# Patient Record
Sex: MALE | Race: WHITE
[De-identification: names, ages, dates, MRNs, and addresses within clinical notes are randomized per-mention and may not be internally consistent; named-entity substitution may affect disease eponyms.]

---

## 2022-01-21 ENCOUNTER — HOSPITAL ENCOUNTER (INPATIENT)
Dept: HOSPITAL 75 - ICU | Age: 62
LOS: 4 days | Discharge: HOME | DRG: 177 | End: 2022-01-25
Attending: INTERNAL MEDICINE | Admitting: INTERNAL MEDICINE
Payer: OTHER GOVERNMENT

## 2022-01-21 VITALS — DIASTOLIC BLOOD PRESSURE: 69 MMHG | SYSTOLIC BLOOD PRESSURE: 125 MMHG

## 2022-01-21 VITALS — WEIGHT: 246.7 LBS | HEIGHT: 69.02 IN | BODY MASS INDEX: 36.54 KG/M2

## 2022-01-21 DIAGNOSIS — G47.33: ICD-10-CM

## 2022-01-21 DIAGNOSIS — I48.0: ICD-10-CM

## 2022-01-21 DIAGNOSIS — J44.9: ICD-10-CM

## 2022-01-21 DIAGNOSIS — I42.2: ICD-10-CM

## 2022-01-21 DIAGNOSIS — E11.9: ICD-10-CM

## 2022-01-21 DIAGNOSIS — T38.0X5A: ICD-10-CM

## 2022-01-21 DIAGNOSIS — J12.82: ICD-10-CM

## 2022-01-21 DIAGNOSIS — E78.2: ICD-10-CM

## 2022-01-21 DIAGNOSIS — Z73.0: ICD-10-CM

## 2022-01-21 DIAGNOSIS — E66.9: ICD-10-CM

## 2022-01-21 DIAGNOSIS — I10: ICD-10-CM

## 2022-01-21 DIAGNOSIS — J96.01: ICD-10-CM

## 2022-01-21 DIAGNOSIS — U07.1: Primary | ICD-10-CM

## 2022-01-21 LAB
ARTERIAL PATENCY WRIST A: (no result)
BASE EXCESS STD BLDA CALC-SCNC: -0.2 MMOL/L (ref -2.5–2.5)
BDY SITE: (no result)
BODY TEMPERATURE: 37
BUN/CREAT SERPL: 16
CALCIUM SERPL-MCNC: 8.2 MG/DL (ref 8.5–10.1)
CHLORIDE SERPL-SCNC: 101 MMOL/L (ref 98–107)
CO2 BLDA CALC-SCNC: 25.9 MMOL/L (ref 21–31)
CO2 SERPL-SCNC: 21 MMOL/L (ref 21–32)
CREAT SERPL-MCNC: 0.64 MG/DL (ref 0.6–1.3)
GFR SERPLBLD BASED ON 1.73 SQ M-ARVRAT: 108 ML/MIN
GLUCOSE SERPL-MCNC: 130 MG/DL (ref 70–105)
INHALED O2 FLOW RATE: 2 L/MIN
PCO2 BLDA: 45 MMHG (ref 35–45)
PH BLDA: 7.36 [PH] (ref 7.37–7.43)
PO2 BLDA: 33 MMHG (ref 79–93)
POTASSIUM SERPL-SCNC: 3.9 MMOL/L (ref 3.6–5)
SAO2 % BLDA FROM PO2: 50 % (ref 94–100)
SODIUM SERPL-SCNC: 133 MMOL/L (ref 135–145)
VENTILATION MODE VENT: NO

## 2022-01-21 PROCEDURE — 83036 HEMOGLOBIN GLYCOSYLATED A1C: CPT

## 2022-01-21 PROCEDURE — 71275 CT ANGIOGRAPHY CHEST: CPT

## 2022-01-21 PROCEDURE — 83735 ASSAY OF MAGNESIUM: CPT

## 2022-01-21 PROCEDURE — 84100 ASSAY OF PHOSPHORUS: CPT

## 2022-01-21 PROCEDURE — 36415 COLL VENOUS BLD VENIPUNCTURE: CPT

## 2022-01-21 PROCEDURE — 80048 BASIC METABOLIC PNL TOTAL CA: CPT

## 2022-01-21 PROCEDURE — 80061 LIPID PANEL: CPT

## 2022-01-21 PROCEDURE — 85025 COMPLETE CBC W/AUTO DIFF WBC: CPT

## 2022-01-21 PROCEDURE — 71045 X-RAY EXAM CHEST 1 VIEW: CPT

## 2022-01-21 PROCEDURE — 80053 COMPREHEN METABOLIC PANEL: CPT

## 2022-01-21 PROCEDURE — 82947 ASSAY GLUCOSE BLOOD QUANT: CPT

## 2022-01-21 PROCEDURE — 94640 AIRWAY INHALATION TREATMENT: CPT

## 2022-01-21 PROCEDURE — 82805 BLOOD GASES W/O2 SATURATION: CPT

## 2022-01-21 PROCEDURE — 94760 N-INVAS EAR/PLS OXIMETRY 1: CPT

## 2022-01-21 PROCEDURE — 94761 N-INVAS EAR/PLS OXIMETRY MLT: CPT

## 2022-01-21 PROCEDURE — 93005 ELECTROCARDIOGRAM TRACING: CPT

## 2022-01-21 PROCEDURE — 93306 TTE W/DOPPLER COMPLETE: CPT

## 2022-01-21 RX ADMIN — SODIUM CHLORIDE SCH MLS/HR: 900 INJECTION, SOLUTION INTRAVENOUS at 17:49

## 2022-01-21 RX ADMIN — INSULIN ASPART SCH UNIT: 100 INJECTION, SOLUTION INTRAVENOUS; SUBCUTANEOUS at 21:18

## 2022-01-21 RX ADMIN — DOCUSATE SODIUM SCH MG: 100 CAPSULE ORAL at 21:18

## 2022-01-21 RX ADMIN — GUAIFENESIN SCH MG: 600 TABLET, EXTENDED RELEASE ORAL at 21:06

## 2022-01-21 RX ADMIN — ENOXAPARIN SODIUM SCH MG: 100 INJECTION SUBCUTANEOUS at 17:49

## 2022-01-21 RX ADMIN — SENNOSIDES SCH MG: 8.6 TABLET, FILM COATED ORAL at 21:18

## 2022-01-21 RX ADMIN — SODIUM CHLORIDE SCH MLS/HR: 900 INJECTION INTRAVENOUS at 21:07

## 2022-01-21 RX ADMIN — INSULIN ASPART SCH UNIT: 100 INJECTION, SOLUTION INTRAVENOUS; SUBCUTANEOUS at 16:00

## 2022-01-21 NOTE — DIAGNOSTIC IMAGING REPORT
PROCEDURE: CT angiography of the chest with contrast.



TECHNIQUE: Multiple contiguous axial images were obtained through

the chest after uneventful bolus administration of intravenous

contrast. 3D reconstructed CTA MIP acquisitions were also

performed. Auto Exposure Controls were utilized during the CT

exam to meet ALARA standards for radiation dose reduction. 

 

INDICATION: Covid patient. Respiratory distress.



FINDINGS: There are no intraluminal pulmonary arterial filling

defects. There are no findings of pulmonary arterial embolus. The

heart is mildly enlarged. There are five lobe patchy groundglass

infiltrates in keeping with the history of Covid pneumonia. No

effusion or pneumothorax. No mediastinal gas. The visualized

upper abdomen nonacute. The aorta is patent and nonaneurysmal.

There is a tiny hiatal hernia.



IMPRESSION: No PE or acute aortic disease identified. Five lobe

groundglass infiltrates consistent with Covid pneumonia. No

effusion or pneumothorax. 



Dictated by: 



  Dictated on workstation # [id number]

## 2022-01-21 NOTE — XMS REPORT
Clinical Summary

                             Created on: 2022



Joelle Azul

External Reference #: [id number]

: 1960

Sex: Male



Demographics





                          Address                    [address] [place] [address]  [address]

 

                          Home Phone                [phone]

 

                          Preferred Language        English

 

                          Marital Status            

 

                          Yazidi Affiliation     [id number]

 

                          Race                      White

 

                          Ethnic Group              Not  or 





Author





                          Author                    [facility]

 

                          Organization              [facility]

 

                          Address                   Unknown

 

                          Phone                     Unavailable







Support





                Name            Relationship    Address         Phone

 

                    Jordana Azul       [employer]                 [address] [place] [address]  [address]                      [phone]







Care Team Providers





                    Care Team Member Name Role                Phone

 

                    AndersonMary Ellen AMAYA PCP                 [phone]







Allergies





                                        Comments



                 Active Allergy  Reactions       Severity        Noted Date 

 

                                        



Tightness in throat and fever



                 Influenza Virus Vaccines  Shortness Of    High             



                                         Breath,   



                                         Nausea And   



                                         Vomiting   







Medications





                          End Date                  Status



              Medication   Sig          Dispensed    Refills      Start  



                                         Date  

 

                                                    Active



              multivitamin (THERAGRAN)  take 1 tablet  1            0           

   



                     per tablet          by oral route       4  



                                         every day     



                                         with food     

 

                                                    Active



                     ipratropium-albuterol  Inhale 3 mL         0   



                           (DUO-NEB) 0.5-3 mg/3 mL   via nebulizer     



                           nebulizer                 4 (four)     



                                         times a day     



                                         as needed.     

 

                                                    Active



                     acetaminophen 325 mg cap  Take by mouth       0   



                                         as needed.     

 

                                                    Active



                     ipratropium (ATROVENT)  Use 2 sprays        0   



                           0.06 % nasal spray        in each     



                                         nostril 4     



                                         (four) times     



                                         a day as     



                                         needed.     

 

                                                    Active



                     omega 3 fish oil (SEA  Take 3              0   



                           OMEGA)  mg-  capsules by     



                           mg (1,000 mg) capsule     mouth daily.     

 

                                                    Active



                     docusate sodium (COLACE)  Take 100 mg         0   



                           100 MG capsule            by mouth as     



                                         needed for     



                                         constipation.     

 

                                                    Active



                     LUTEIN ORAL         Take 1 tablet       0   



                                         by mouth     



                                         daily.     

 

                                                    Active



              atorvastatin (LIPITOR) 20  TAKE ONE (1)  90 tablet    3           

 02/10/202  



                     MG tablet           TABLET (20 MG       1  



                                         TOTAL) BY     



                                         MOUTH EVERY     



                                         OTHER DAY.     

 

                                                    Active



                     COQ10, UBIQUINOL, ORAL  Take 1 tablet       0   



                                         by mouth     



                                         daily.     

 

                                                    Active



                     ascorbic acid (VITAMIN C  Take 1 tablet       0   



                           ORAL)                     by mouth     



                                         daily.     

 

                                                    Active



                     ZINC ORAL           Take 1 tablet       0   



                                         by mouth     



                                         daily.     

 

                                                    Active



              metoprolol succinate  TAKE ONE (1)  90 tablet    3              



                     (TOPROL-XL) 100 MG 24 hr  TABLET BY           1  



                           tabletIndications: HOCM   MOUTH DAILY     



                                         (hypertrophic obstructive      



                                         cardiomyopathy) (HCC)      







Active Problems





 



                           Problem                   Noted Date

 

 



                           Hiatal hernia             12/10/2020

 

 



                           Atrial flutter            2018

 

 



                           S/P ablation of atrial fibrillation  2017

 

 



                           Agatston CAC score, <100  2017

 

 



                                         Overview:



                                         Formatting of this note might be differ

ent from the original.



                                         Agatston Score: 54.2

 

 



                           Reactive airway disease   2015

 

 



                           S/P patent foramen ovale closure  2005

 

 



                           H/O ventricular septal myectomy  2005

 

 



                           S/P mitral valve repair   2005

 

 



                                         Overview:



                                         Formatting of this note might be differ

ent from the original.



                                         #28 Gloria ring

 

 



                                         HOCM (hypertrophic obstructive cardiomy

opathy) 

 

 



                                         Mixed hyperlipidemia 

 

 



                                         Essential hypertension 

 

 



                                         Family history of premature CAD 

 

 



                                         Overview:



                                         Formatting of this note might be differ

ent from the original.



                                         sister at age 48

 

 



                                         LBBB (left bundle branch block) 

 

 



                                         Obesity (BMI 30-39.9) 

 

 



                                         Atrial dilatation, bilateral 

 

 



                                         Overview:



                                         Formatting of this note might be differ

ent from the original.



                                         Severe

 

 



                                         Persistent atrial fibrillation 

 

 



                                         Hypothyroidism 

 

 



                                         Coronary artery calcification of native

 artery 







Resolved Problems





  



                     Problem             Noted Date          Resolved Date

 

  



                           Coronary artery disease involving native coronary art

luisa of native heart  

2017                               12/10/2020

 

  



                           Atrial fibrillation       2017

 

  



                           Pre-diabetes              2017

 

  



                           Long term current use of anticoagulant   12/10/2020

 

  



                           Long term current use of antiarrhythmic drug   12/10/

2020







Encounters





                          Care Team                 Description



                     Date                Type                Specialty  

 

                                        



Yoan Chu MD                 Persistent atrial fibrillation (Primary 

Dx); 

Typical atrial flutter (HCC); 

HOCM (hypertrophic obstructive cardiomyopathy) (HCC); 

Dyslipidemia; 

LBBB (left bundle branch block); 

S/P ablation of atrial fibrillation; 

S/P ablation of atrial flutter; 

S/P ventricular septal myectomy; 

S/P mitral valve repair; 

S/P left atrial appendage ligation



                     2021          Office Visit        Cardiology  

 

                                        



Allison Kline RN                       



                     2021          Abstract            Cardiology  

 

                                        



Yoan Chu MD                  



                     2021          Documentation       Cardiology  



from Last 3 Months



Family History





   



                 Medical History  Relation        Name            Comments

 

   



                           Asthma                    Daughter  

 

   



                           Colon cancer              Father  

 

   



                           Hypertension              Maternal  



                                         Grandfather  

 

   



                           Stroke                    Maternal  



                                         Grandmother  

 

   



                           Coronary artery bypass    Sister  



                                         graft   

 

   



                           Coronary artery disease   Sister  

 

   



                           Diabetes                  Sister  







   



                 Relation        Name            Status          Comments

 

   



                                         Daughter   

 

   



                     Father                          Cause of death was 

Cancer, non-cardiac at age 48.



                                         (Age 48) 

 

   



                                         Maternal Grandfather   

 

   



                                         Maternal Grandmother   

 

   



                           Mother                     

 

   



                                         Sister   







Social History





                                        Date



                 Tobacco Use     Types           Packs/Day       Years Used 

 

                                         



                 Former Smoker   Cigars,         0.5             20 



                                         Cigarettes   

 

    



                     Smokeless Tobacco: Former  Chew                Quit: 



                                         User   







                                        Comments



                           Alcohol Use               Standard Drinks/Week 

 

                                         



                           Yes                       5 (1 standard drink = 0.6 o

z pure alcohol) 







 



                           Sex Assigned at Birth     Date Recorded

 

 



                                         Not on file 







Last Filed Vital Signs





                    Reading             Time Taken          Comments



                                         Vital Sign   

 

                    124/97              2021 10:38 AM CST  



                                         Blood Pressure   

 

                    65                  2021 10:38 AM CST  



                                         Pulse   

 

                    36.7 C (98.1 F) 2019  7:02 AM CDT  



                                         Temperature   

 

                    16                  2019  7:02 AM CDT  



                                         Respiratory Rate   

 

                    93%                 2019 10:22 AM CDT  



                                         Oxygen Saturation   

 

                    -                   -                    



                                         Inhaled Oxygen   



                                         Concentration   

 

                    112.5 kg (248 lb)   2021 10:38 AM CST  



                                         Weight   

 

                    170.2 cm (5' 7")    2021 10:38 AM CST  



                                         Height   

 

                    38.84               2021 10:38 AM CST  



                                         Body Mass Index   







Plan of Treatment





   



                 Health Maintenance  Due Date        Last Done       Comments

 

   



                           Hepatitis C Screen        1960  

 

   



                           Td/Tdap#                  1960  

 

   



                           COVID-19 Vaccine (1)      1965  

 

   



                           Colonoscopy               2005  

 

   



                           Zoster Vaccine# (1 of 2)  2010  

 

   



                     Pneumococcal Vaccine:  Aged Out            No longer eligib

le based on patient's age to



                           Pediatrics (0 to 5 Years)    complete this topic



                                         and At-Risk Patients (6   



                                         to 64 Years)   







Goals





     



            Goal       Patient    Associated  Recent Progress  Patient-Stat  Aut

hor



                     Goal Type           Problems            ed? 

 

     



              Blood Pressure < 140/90  Blood        124/97 (2021  No      

     Giocondo,



                     Pressure            10:38 AM CST)       Yoan BELLO MD







Procedures





                                        Comments



                 Procedure Name  Priority        Date/Time       Associated Diag

nosis 

 

                                        



Results for this procedure are in the results section.



                 ECG             Routine         2021      Typical atrial 

flutter 



                           10:31 AM CST              (HCC) 



                                         Persistent atrial 



                                         fibrillation 



                                         LBBB (left bundle branch 



                                         block) 



from Last 3 Months



Results

* Electrocardiogram (ECG) (2021 10:31 AM CST)



    



              Component    Value        Ref Range    Performed At  Pathologist



                                         Signature

 

    



                     QRSd                130                 [facility] 

 

    



                     QT                  428                 [facility] 

 

    



                     QTC                 445                 [facility] 

 

    



                     ECGHR               65                  [facility] 

 

    



                     ECGPR               188                 [facility] 











                                         Specimen

 









                                        Narrative

 

                                        



[facility] - 2021  8:53 AM CST



This result has an attachment that is not available.



               [facility]

                    

                    Test Date:  
2021

Pat Name:   JOELLE AZUL       Department:  [facility]

Patient ID:  [id number]         Room:     

Gender:    Male           Technician:  [id number]

:     1960        Requested By: YOAN CHU 

Order Number: [id number]        Reading MD:  Yoan Chu

                 Measurements

Intervals               Axis     

Rate:     65            P:      -36

NM:      188           QRS:     -14

QRSD:     130           T:      155

QT:      428                  

QTc:     445                  

              Interpretive Statements

SINUS RHYTHM

LEFT BUNDLE BRANCH BLOCK

Electronically Signed On 2021 8:53:00 CST by Yoan Chu







                                        Procedure Note

 

                                        



Yoan Chu MD - 2021



Formatting of this note might be different from the original.

                             [facility]

                                       

                                       Test Date:    2021

Pat Name:     JOELLE AZUL             Department:   [facility]

Patient ID:   [id number]                 Room:         

Gender:       Male                     Technician:   [id number]

:          1960               Requested By: YOAN CHU 

Order Number: [id number]                Reading MD:   Yoan Chu

                                 Measurements

Intervals                              Axis          

Rate:         65                       P:            -36

NM:           188                      QRS:          -14

QRSD:         130                      T:            155

QT:           428                                    

QTc:          445                                    

                           Interpretive Statements

SINUS RHYTHM

LEFT BUNDLE BRANCH BLOCK

Electronically Signed On 2021 8:53:00 CST by Yoan Chu







   



                 Performing Organization  Address         City/State/ZIP Code  P

jason Number

 

   



                                         [facility]   





from Last 3 Months



Insurance





                                        Type



            Payer      Benefit    Subscriber ID  Effective  Phone      Address 



                           Plan /                    Dates   



                                         Group     

 

                                         



            OTHER GOVERNMENT  [facility]    [id number]  3/25/2020-  [phone]  [facility]                Present             2021 



                           [place] 



                                         [address] 







     



            Guarantor Name  Account    Relation to  Date of    Phone      [place]

g Address



                     Type                Patient             Birth  

 

     



            Joelle Azul  Personal/F  Self       1960  [phone]  [address]



                     amily               (Home)              [place] [address]

 

     



            Joelle Azul  Personal/F  Self       1960  [phone]  [address]



                     amily               (Home)              [place] [address]

 

     



            Joelle Azul  Personal/F  Self       1960  [phone]  [address]



                     amily               (Home)              [place] [address]

 

     



            CristobalJoelle mckenzie ACE  Personal/F  Self       1960  [phone]  [address]



                     amily               (Home)              [place] [address]



                                         [phone] 



                                         (Work) 







Advance Directives





For more information, please contact: [phone]







                          Patient Representative    Explanation



                           Type                      Date Recorded  

 

                                                     



                                         Advance Directives   



                                         and Living Will   

 

                                                     



                                         Power of    

 

                                                     



                                         Health Care   



                                         Directive   











                          Date Inactivated          Comments



                           Code Status               Date Activated  

 

                          3/12/2019  1:35 PM         



                           Full Code                 3/11/2019 12:26 PM  







                                                     

 

                          2017  1:26 PM         



                           Full Code                 11/3/2017 11:24 AM  







Care Teams





                          Start Date                End Date



                     Team Member         Relationship        Specialty  

 

                          16                   



                     Mary Ellen Barron ARNP  PCP - General       Nurse  



                           [id number]           Practitioner  



                                         [place] [address]    



                                         [phone] (Work)    



                                         [phone] (Fax)

## 2022-01-21 NOTE — XMS REPORT
Encounter Summary

                             Created on: 2022



Carlos Azul

External Reference #: [id number]

: 1960

Sex: Male



Demographics





                          Address                    [address] [place] [address]  [address]

 

                          Home Phone                [phone]

 

                          Preferred Language        English

 

                          Marital Status            

 

                          Worship Affiliation     [id number]

 

                          Race                      White

 

                          Ethnic Group              Not  or 





Author





                          Author                    [facility]

 

                          Organization              [facility]

 

                          Address                   Unknown

 

                          Phone                     Unavailable







Support





                Name            Relationship    Address         Phone

 

                    Jordana Azul       ECON                 [address] [place] [address]  [address]                      [phone]







Care Team Providers





                    Care Team Member Name Role                Phone

 

                    Mary Ellen Barron PCP                 [phone]







Encounter Details





                          Care Team                 Description



                     Date                Type                Department  

 

                                        



Allison Kline RN                       



                     2021          Abstract            [facility]  



                                         [facility]  



                                         [facility]  



                                         [id number] [facility]  



                                         [address]  



                                         [place] [address]  



                                         [phone]  







Social History





                                        Date



                 Tobacco Use     Types           Packs/Day       Years Used 

 

                                        Quit: 1976



                 Former Smoker   Cigars,         0.5             20 



                                         Cigarettes   

 

    



                     Smokeless Tobacco: Former  Chew                Quit: 1990



                                         User   







                                        Comments



                           Alcohol Use               Standard Drinks/Week 

 

                                         



                           Yes                       0 (1 standard drink = 0.6 o

z pure alcohol) 







 



                           Sex Assigned at Birth     Date Recorded

 

 



                                         Not on file 



documented as of this encounter



Plan of Treatment





Not on filedocumented as of this encounter



Goals





     



            Goal       Patient    Associated  Recent Progress  Patient-Stat  Aut

hor



                     Goal Type           Problems            ed? 

 

     



              Blood Pressure < 140/90  Blood        124/97 (2021  No      

     Giocondo,



                     Pressure            10:38 AM CST)       Yoan BELLO MD



documented as of this encounter



Visit Diagnoses









                                         Diagnosis

 





                                         S/P ablation of atrial fibrillation



                                         Other postprocedural status



documented in this encounter



Care Teams





                          Start Date                End Date



                     Team Member         Relationship        Specialty  

 

                          16                   



                     Mary Ellen Barron ARNP  PCP - General       Nurse  



                           [address]           Practitioner  



                                         [place] [address] [phone] (Work)    



                                         [phone] (Fax)    



documented as of this encounter

## 2022-01-21 NOTE — HISTORY & PHYSICAL
History of Present Illness


HPI/Chief Complaint


Chief Complain: Shortness of Breath





HPI: This is a 61yoWM who has a history of asthma, COPD, and Hypertrophic 

Cardiomyopathy managed at [facility] who presented from [facility] ER

with Acute Hypoxic Respiratory Failure he remains unvaccinated. Due to the fact 

of the heart disorder he met criteria for higher level of care, I did confer 

with Dr. Rodriguez and I did update the ICU on the incoming transfer. He will be 

placed on Cefepime and Azithromycin, Lovenox and Decadron and BiPAP, and 

Vapotherm as needed. Pt is at high risk for intubation.  I did speak to him 

about Actemra.  Monitor closely.


Source:  patient, RN/MD


Exam Limitations:  no limitations


Date Seen


1/21/22


Time Seen by a Provider:  18:30


Attending Physician


Christie Yancey DO


PCP





Referring Physician





Date of Admission


Jan 21, 2022 at 15:52





Home Medications & Allergies


Home Medications


Reviewed patient Home Medication Reconciliation performed by pharmacy medication

reconciliations technician and/or nursing.


Patients Allergies have been reviewed.





Allergies





Allergies


Coded Allergies


  No Known Drug Allergies (Unverified1/21/22)








Past Medical-Social-Family Hx


Past Med/Social Hx:  Reviewed Nursing Past Med/Soc Hx, Reviewed and Corrections 

made


Patient Social History


Marrital Status:  


Employed/Student:  employed ([employer] )


Alcohol Use:  Occasionally Uses


Smoking Status:  Never a Smoker





Past Medical History


Cardiac:  High Cholesterol, Hypertension





Review of Systems


Constitutional:  see HPI, dizziness, fever, malaise, weakness


EENTM:  no symptoms reported


Respiratory:  cough, dyspnea on exertion, short of breath


Cardiovascular:  no symptoms reported


Gastrointestinal:  no symptoms reported


Genitourinary:  no symptoms reported


Musculoskeletal:  back pain


Skin:  no symptoms reported


Psychiatric/Neurological:  No Symptoms Reported


All Other Systems Reviewed


Negative Unless Noted:  Yes





Physical Exam


Physical Exam


Vital Signs





Vital Signs - First Documented








 1/21/22 1/21/22 1/21/22





 17:00 20:35 20:39


 


Temp   36.2


 


Pulse 80  


 


Resp 21  


 


B/P (MAP) 125/69  


 


Pulse Ox 93  


 


O2 Delivery Nasal Cannula  


 


O2 Flow Rate 2.00  


 


FiO2  21 





Capillary Refill :


Height, Weight, BMI


Height: '"


Weight: lbs. oz. kg; 37.74 BMI


Method:


General Appearance:  No Apparent Distress, Anxious, Chronically ill, Obese


HEENT:  PERRL/EOMI, Normal ENT Inspection, Pharynx Normal


Neck:  Full Range of Motion, Normal Inspection, Non Tender, Supple, Carotid 

Bruit


Respiratory:  No Respiratory Distress, Accessory Muscle Use, Decreased Breath 

Sounds


Cardiovascular:  Regular Rate, Rhythm, No Edema, Normal Peripheral Pulses


Gastrointestinal:  Normal Bowel Sounds, No Organomegaly, No Pulsatile Mass, Non 

Tender, Soft


Extremity:  Normal Capillary Refill, Normal Inspection, Normal Range of Motion, 

Non Tender, No Calf Tenderness, No Pedal Edema


Neurologic/Psychiatric:  Alert, Oriented x3, No Motor/Sensory Deficits, Normal 

Mood/Affect


Skin:  Normal Color, Warm/Dry


Lymphatic:  No Adenopathy





Results


Results/Procedures


Labs


Laboratory Tests


1/21/22 17:37








1/22/22 04:07








Patient resulted labs reviewed.





Assessment/Plan


Admission Diagnosis


Assessment:


Acute hypoxic respiratory failure


COVID-19 pneumonia


Presumed ANDREW undiagnosed


Obesity BMI 37


Hypertrophic cardiomyopathy management [facility]


Hypertension





Plan:


COVID-19 protocol


May be a candidate for Actemra if progresses


High risk for intubation


Echo


Cardiology appreciated


eICU appreciated


Admission Status:  Inpatient Order (span 2 midnights)


Reason for Inpatient Admission:  


Acute respiratory failure





Diagnosis/Problems


Diagnosis/Problems





(1) COVID


(2) Hypertrophic cardiomyopathy


(3) Obesity (BMI 30-39.9)


(4) Hypertension


(5) Hyperlipidemia


(6) ANDREW (obstructive sleep apnea)











CHRISTIE YANCEY DO                Jan 21, 2022 18:22

## 2022-01-21 NOTE — XMS REPORT
Encounter Summary

                             Created on: 2022



Joelle Azul

External Reference #: [id number]

: 1960

Sex: Male



Demographics





                          Address                    [address] [place] [address]  [address]

 

                          Home Phone                [phone]

 

                          Preferred Language        English

 

                          Marital Status            

 

                          Restorationist Affiliation     [id number]

 

                          Race                      White

 

                          Ethnic Group              Not  or 





Author





                          Author                    [facility]

 

                          Organization              [facility]

 

                          Address                   Unknown

 

                          Phone                     Unavailable







Support





                Name            Relationship    Address         Phone

 

                    Jordana Azul       ECON                 [address] [place] [address]  [address]                      [phone]







Care Team Providers





                    Care Team Member Name Role                Phone

 

                    AndersonMary Ellen AMAYA PCP                 [phone]







Reason for Referral

* Diagnostic Imaging (Routine) - Authorized



                    Diagnoses / Procedures Referred By [facility]

ct



                                         Specialty   

 

                                        



Diagnoses



Typical atrial flutter (HCC)



Persistent atrial fibrillation (HCC)



LBBB (left bundle branch block)





Procedures



Electrocardiogram (ECG)                 



Omer Chu MD



[address]



[address]



[place] [address]



Phone: [phone]



Fax: [phone]                       











      



           Referral ID  Status    Reason    Start Date  Expiration  Visits    Vi

sits



                     Date                Requested           Authorized

 

      



            [id number]    Authorized   2021   1          1









Electronically signed by Omer Chu MD at 2021 10:25 AM CST





Reason for Visit

* 



 



                           Reason                    Comments

 

 



                                         Atrial fibrillation 

 

 



                                         Atrial flutter 

 

 



                                         Cardiomyopathy 

 

 



                                         LBBB 









Encounter Details





                          Care Team                 Description



                     Date                Type                Department  

 

                                        



Omer Chu MD



[address]



[address]



[place] [address]



[phone] (Work)



[phone] (Fax)                      Persistent atrial fibrillation (Primary 

Dx); 

Typical atrial flutter (HCC); 

HOCM (hypertrophic obstructive cardiomyopathy) (HCC); 

Dyslipidemia; 

LBBB (left bundle branch block); 

S/P ablation of atrial fibrillation; 

S/P ablation of atrial flutter; 

S/P ventricular septal myectomy; 

S/P mitral valve repair; 

S/P left atrial appendage ligation



                     2021          Office Visit        [facility]  



                                         [address]  



                                         [address]  



                                         [place], [place] [address] [phone]  







Social History





                                        Date



                 Tobacco Use     Types           Packs/Day       Years Used 

 

                                         



                 Former Smoker   Cigars,         0.5             20 



                                         Cigarettes   

 

    



                     Smokeless Tobacco: Former  Chew                Quit: 



                                         User   







                                        Comments



                           Alcohol Use               Standard Drinks/Week 

 

                                         



                           Yes                       5 (1 standard drink = 0.6 o

z pure alcohol) 







 



                           Sex Assigned at Birth     Date Recorded

 

 



                                         Not on file 



documented as of this encounter



Last Filed Vital Signs





                    Reading             Time Taken          Comments



                                         Vital Sign   

 

                    124/97              2021 10:38 AM CST  



                                         Blood Pressure   

 

                    65                  2021 10:38 AM CST  



                                         Pulse   

 

                    -                   -                    



                                         Temperature   

 

                    -                   -                    



                                         Respiratory Rate   

 

                    -                   -                    



                                         Oxygen Saturation   

 

                    -                   -                    



                                         Inhaled Oxygen   



                                         Concentration   

 

                    112.5 kg (248 lb)   2021 10:38 AM CST  



                                         Weight   

 

                    170.2 cm (5' 7")    2021 10:38 AM CST  



                                         Height   

 

                    38.84               2021 10:38 AM CST  



                                         Body Mass Index   



documented in this encounter



Patient Instructions

* Patient Instructions* 



 Stefany Banks RN - 2021 11:05 AM CST



Formatting of this note is different from the original.

Your doctor has ordered the following test(s):

 No testing ordered

A nurse will contact you with the results and your doctor's recommendations appr
oximately 7-10 business days after the test has been completed.



Medication Instructions:

Continue current medications



Additional Instructions:

Things you can check to clue you in to A-fib:

 Check your pulse and see if it feels irregular verses regular. If it feels ir
regular you may be in A-fib. 

 Some BP monitors have a feature that will indicate you are in an irregular rh
ythm.  If it is irregular you may be in A-fib.

 Count your pulse and if your pulse is above 100 at rest then you may be in A-
fib

 Check your rhythm with the [employer] Kardia monitor and jacques or Apple watch 4 
or above. (These are simply optional devices you can purchase to help you monito
r your rhythm)  They will tell you if you are in A-fib. 



Things that reduce your risk of a-fib reoccurence

 Moderate exercise 5-6 days per week

 Living at a healthy weight

 Limit alcohol intake to no more than 1 drink daily

 Treat sleep apnea if you have it or develop it

 Treat hypertension and the goal is to be in the normal range <140/80



Goal is to get  a minimum of 150 mins aerobic exercise per week. It can be as si
mple as 30 mins of walking 5 days per week.



Follow up with MD/Advanced Practice Provider: annually with Dr. Chu



Please call the Dr. Chu's Nurse Line at [phone] ([facility] Nurse Team). with
any questions or concerns. For medication refill needs, please first contact [facility] pharmacy.

 

If you have not heard from scheduling within about 3-4 months of your next appoi
ntment being due then please call us to schedule.  For any [facility] scheduling questions, please call [phone]. 



At [facility], high quality patient care is our top priority.  To ensure our
cardiovascular standards are continuously met, you may receive a survey via ema
il or text message, and we ask that you please take the time to fill it out. We 
strive to ensure you are very satisfied with every visit.  Thank you in advance 
for taking the time to fill this out. 



It was a pleasure to see you again.  

Dr. Chu and RANCHO Mccall





Electronically signed by Stefany Banks RN at 2021 10:52 AM CST





documented in this encounter



Progress Notes

* Omer Chu MD - 2021 11:05 AM CST



Formatting of this note is different from the original.

[facility]



Appointment Date: 2021



AMAYA Kim

[address] [facility] [address]



RE:  Joelle Azul

:   1960  

Visit provider:  Omer Chu MD



Dear AMAYA Kim,



I had the pleasure of seeing Joelle Azul in the office today. He is a(n) 61 y.o
. male and presents with the following chief complaint(s): Atrial fibrillation, 
Atrial flutter, Cardiomyopathy, and LBBB



HPI:  

He presents for annual cardiac electrophysiology followup due to a history of sy
mptomatic recurrent persistent atrial fibrillation and atrial flutter.  He has a
history of hypertrophic obstructive cardiomyopathy, status post surgical myecto
my with concomitant mitral valve repair and left atrial appendage ligation.  It 
is noteworthy that the left atrial appendage is not fully ligated, however.  He 
underwent a repeat invasive electrophysiology study in 2019, where we determined
that the pulmonary veins remained electrically isolated from the initial ablati
on procedure.  We isolated the posterior wall using a "box lesion" and performed
radiofrequency catheter ablation of the cavotricuspid isthmus.  We determined t
hat the superior vena cava was electrically isolated from the right atrium.  Ove
r the past year, he has had only fleeting symptoms of palpitations.  He denies s
ymptoms of chest pain, syncope, presyncope, dyspnea, or lower extremity edema.  
Laboratory tests are monitored by his primary care physician.



 

Patient Active Problem List 

Diagnosis SNOMED CT(R) 

 HOCM (hypertrophic obstructive cardiomyopathy) (HCC) HYPERTROPHIC OBSTRUCTIV
E CARDIOMYOPATHY 

 Mixed hyperlipidemia MIXED HYPERLIPIDEMIA 

 Essential hypertension ESSENTIAL HYPERTENSION 

 Family history of premature CAD FH: PREMATURE CORONARY HEART DISEASE 

 LBBB (left bundle branch block) LEFT BUNDLE BRANCH BLOCK 

 S/P patent foramen ovale closure HISTORY OF REPAIR OF ATRIAL SEPTAL DEFECT 

 H/O ventricular septal myectomy H/O CARDIAC SURGERY 

 Obesity (BMI 30-39.9) BODY MASS INDEX 30+ - OBESITY 

 Agatston CAC score, <100 CORONARY ARTERY FINDING 

 Atrial dilatation, bilateral ATRIAL DILATATION 

 S/P mitral valve repair HISTORY OF REPAIR OF MITRAL VALVE 

 S/P ablation of atrial fibrillation H/O: ATRIAL FIBRILLATION 

 Atrial flutter (HCC) ATRIAL FLUTTER 

 Persistent atrial fibrillation PERSISTENT ATRIAL FIBRILLATION 

 Hypothyroidism HYPOTHYROIDISM 

 Reactive airway disease REACTIVE AIRWAY DISEASE 

 Hiatal hernia HIATAL HERNIA 

 Coronary artery calcification of native artery CALCIFICATION OF CORONARY ART
CISCO 



Past Medical History: 

Diagnosis Date 

 Agatston CAC score, <100 2017 

 Agatston Score: 54.2 

 Asthma  

 Atrial dilatation, bilateral  

 Atrial fibrillation (HCC)  

 Atrial flutter (HCC)  

 Coronary artery calcification of native artery  

 Essential hypertension  

 Family history of premature CAD  

 Sister at age 48 

 Hiatal hernia 12/10/2020 

 HOCM (hypertrophic obstructive cardiomyopathy) (HCC)  

 Hypothyroidism  

 LBBB (left bundle branch block)  

 Mitral valve insufficiency  

 Mixed hyperlipidemia  

 Obesity (BMI 30-39.9)  

 Osteoarthritis  

 Persistent atrial fibrillation (HCC)  

 PFO (patent foramen ovale)  

 Congenital 

 Pre-diabetes  

 Reactive airway disease  



Past Surgical History: 

Procedure Laterality Date 

 CARDIAC CATHETERIZATION  2005 

 Normal coronaries.  Patient was found to have a 70mmHg left ventricular outflow
gradient. 

 CARDIOVERSION  2017 

 Atrial Fib: Successful DCCV to NSR with one shock of 150j. 

 CARDIOVERSION  2017 

 Atrial fib: Successful DCCV to NSR with one shock of 120j.  

 CARDIOVERSION  2018 

 Atrial fib: Successful DCCV to NSR with one shock of 200j. 

 CARDIOVERSION  2018 

 Atrial Flutter: Successful DCCV to NSR with biphasic synchronous shock of 120 J
oules. 

 CARDIOVERSION  2018 

 Atrial Flutter: Successful DCCVto SR following 1 shock at 50j. 

 CLOSURE, PATENT FORAMEN OVALE  2005 

 COLONOSCOPY  2008 

 ELECTROPHYSIOLOGY STUDY WITH POSSIBLE LEFT ATRIAL ABLATION WITH REPEAT ABLAT
ION IF INDICATED N/A 11/3/2017 

 Successful EPS and pulmonary vein cryoablation or treatment of atrial fibrillat
ion. 

 ELECTROPHYSIOLOGY STUDY WITH POSSIBLE LEFT ATRIAL ABLATION WITH REPEAT ABLAT
ION IF INDICATED N/A 3/11/2019 

 Successful EPS and linear radiofrequency ablation "box lesion" of the left atri
um to electrically isolate the posterior wall. Successful linear radiofrequency 
catheter ablation of the cavo-tricuspid isthmus for treatment of typical atrial 
flutter.  

 ESOPHAGOGASTRODUODENOSCOPY   

 SUNNY Ligation  2005 

 MITRAL VALVE REPAIR  2005 

  #28 Gloria ring 

 MYECTOMY, SEPTAL  2005 



Final Medications:

Current Outpatient Medications 

Medication Sig Dispense Refill 

 acetaminophen 325 mg cap Take by mouth as needed.    

 ascorbic acid (VITAMIN C ORAL) Take 1 tablet by mouth daily.   

 atorvastatin (LIPITOR) 20 MG tablet TAKE ONE (1) TABLET (20 MG TOTAL) BY CARMELA
TH EVERY OTHER DAY. 90 tablet 3 

 COQ10, UBIQUINOL, ORAL Take 1 tablet by mouth daily.   

 docusate sodium (COLACE) 100 MG capsule Take 100 mg by mouth as needed for c
onstipation.   

 ipratropium (ATROVENT) 0.06 % nasal spray Use 2 sprays in each nostril 4 (fo
ur) times a day as needed.    

 ipratropium-albuterol (DUO-NEB) 0.5-3 mg/3 mL nebulizer Inhale 3 mL via nebu
lizer 4 (four) times a day as needed.    

 LUTEIN ORAL Take 1 tablet by mouth daily.    

 metoprolol succinate (TOPROL-XL) 100 MG 24 hr tablet TAKE ONE (1) TABLET BY 
MOUTH DAILY 90 tablet 3 

 multivitamin (THERAGRAN) per tablet take 1 tablet by oral route  every day w
ith food 1 0 

 omega 3 fish oil (SEA OMEGA)  mg- mg (1,000 mg) capsule Take 3
capsules by mouth daily.   

 ZINC ORAL Take 1 tablet by mouth daily.   



No current facility-administered medications for this visit. 



Allergies 

Allergen Reactions 

 Influenza Virus Vaccines Shortness Of Breath and Nausea And Vomiting 

  Tightness in throat and fever 



Family History: 

Problem Relation Age of Onset 

 Colon cancer Father  

 Coronary artery bypass graft Sister  

 Diabetes Sister  

 Coronary artery disease Sister  

 Asthma Daughter  

 Stroke Maternal Grandmother  

 Hypertension Maternal Grandfather  



Social History:

Social History 



Tobacco Use 

 Smoking status: Former Smoker 

  Packs/day: 0.50 

  Years: 20.00 

  Pack years: 10.00 

  Types: Cigars, Cigarettes 

 Smokeless tobacco: Former User 

  Types: Chew 

  Quit date:  

Substance Use Topics 

 Alcohol use: Yes 

  Alcohol/week: 5.0 standard drinks 

  Types: 5 Standard drinks or equivalent per week 

 Drug use: No 



Review of Systems 

Constitutional: Negative for fever, malaise/fatigue and night sweats. 

HENT: Negative for nosebleeds.  

Cardiovascular: Negative for chest pain, claudication, cyanosis, dyspnea on exer
tion, irregular heartbeat, leg swelling, near-syncope, orthopnea, palpitations, 
paroxysmal nocturnal dyspnea and syncope. 

Respiratory: Negative for cough, shortness of breath, sleep disturbances due to 
breathing, snoring and wheezing.  

Endocrine: Negative for cold intolerance and polydipsia. 

Hematologic/Lymphatic: Does not bruise/bleed easily. 

Skin: Negative for dry skin and itching. 

Musculoskeletal: Negative for arthritis, joint pain and myalgias. 

Gastrointestinal: Negative for dysphagia, hematochezia, nausea and vomiting. 

Genitourinary: Negative for hematuria. 

Neurological: Negative for difficulty with concentration, disturbances in coordi
nation, excessive daytime sleepiness, dizziness, light-headedness, loss of baljeet
ce, numbness and paresthesias. 

All other systems reviewed and are negative.



Vital Signs 

 21 1038 

BP: (!) 124/97 

Pulse: 65 

Weight: 112.5 kg (248 lb) 

Height: 1.702 m (5' 7") 

 

BMI: Body mass index is 38.84 kg/m.



Physical Exam

Vitals reviewed. 

Constitutional:  

   General: He is not in acute distress.

   Appearance: He is well-developed. He is not diaphoretic. 

HENT: 

   Head: Normocephalic and atraumatic. 

Eyes: 

   Conjunctiva/sclera: Conjunctivae normal. 

Neck: 

   Thyroid: No thyromegaly. 

   Vascular: No JVD. 

Cardiovascular: 

   Rate and Rhythm: Normal rate and regular rhythm. 

   Heart sounds: Normal heart sounds. No murmur heard.

No friction rub. No gallop.  

Pulmonary: 

   Effort: Pulmonary effort is normal. No respiratory distress. 

   Breath sounds: Normal breath sounds. No wheezing or rales. 

Abdominal: 

   General: Bowel sounds are normal. There is no distension. 

   Palpations: Abdomen is soft. 

   Tenderness: There is no abdominal tenderness. 

Musculoskeletal:    

   General: Normal range of motion. 

   Cervical back: Normal range of motion and neck supple. 

Skin:

   General: Skin is warm and dry. 

Neurological: 

   Mental Status: He is alert and oriented to person, place, and time. 

   Coordination: Coordination normal. 

Psychiatric:    

   Behavior: Behavior normal.    

   Thought Content: Thought content normal.    

   Judgment: Judgment normal. 



  

Cholesterol 

Date Value 

2017 175 mg/dL 

2016 201 mg/dL (H) 

2015 180 

2015 180 mg/dL 



HDL Cholesterol (mg/dL) 

Date Value 

2017 39 (L) 

2016 44 

2015 42 

2015 42 



Triglycerides (mg/dL) 

Date Value 

2017 156 (H) 

2016 196 (H) 

2015 124 

2015 124 



LDL Cholesterol 

Date/Time Value Ref Range Status 

2017 01:10  (H) 0 - 99 mg/dL Final 

2016 09:39  (H) 0 - 99 mg/dL Final 

2015 12:00  mg/dL Final 

2015 12:00  mg/dL Final 



EKG: Normal Sinus Rhythm with LBBB at 65 bpm.



Encounter Diagnoses 

Name Primary? 

 Persistent atrial fibrillation Yes 

 Typical atrial flutter (HCC)  

 HOCM (hypertrophic obstructive cardiomyopathy) (HCC)  

 Dyslipidemia  

 LBBB (left bundle branch block)  

 S/P ablation of atrial fibrillation  

 S/P ablation of atrial flutter  

 S/P ventricular septal myectomy  

 S/P mitral valve repair  

 S/P left atrial appendage ligation  



 IMPRESSION AND PLAN:

1.  Symptomatic recurrent persistent atrial fibrillation and atrial flutter.  He
is status post left atrial ablation and pulmonary vein isolation, posterior wall
isolation and right atrial cavotricuspid isthmus ablation.  He has been arrhyt
ia symptom free off of antiarrhythmic drugs and off of anticoagulant therapy. 
If he develops recurrent atrial fibrillation, he will need to resume oral antico
agulant therapy as the left atrial appendage is only partially ligated.  Continu
e metoprolol succinate.

2.  Hypertrophic obstructive cardiomyopathy, status post surgical myectomy.

3.  Status post mitral valve repair.

4.  Status post partial left atrial appendage ligation.

5.  Status post ablation of atrial fibrillation.

6.  Status post ablation of atrial flutter.

7.  Left bundle branch block.



Treatment goals, progress and next steps, as above, were discussed and mutually 
agreed upon with the patient/family.  



Thank you for allowing me to participate in Joelle Azul's care.  If I can be of
any further assistance, please do not hesitate to contact me.



Sincerely,

Omer Chu MD/pl







Electronically signed by Omer Chu MD at 2021  8:12 AM CST

documented in this encounter



Plan of Treatment





Not on filedocumented as of this encounter



Goals





     



            Goal       Patient    Associated  Recent Progress  Patient-Stat  Aut

hor



                     Goal Type           Problems            ed? 

 

     



              Blood Pressure < 140/90  Blood        124/97 (2021  Paula Chu,



                     Pressure            10:38 AM CST)       Omer BELLO MD



documented as of this encounter



Procedures





                                        Comments



                 Procedure Name  Priority        Date/Time       Associated Diag

nosis 

 

                                        



Results for this procedure are in the results section.



                 ECG             Routine         2021      Typical atrial 

flutter 



                           10:31 AM CST              (HCC) 



                                         Persistent atrial 



                                         fibrillation 



                                         LBBB (left bundle branch 



                                         block) 



documented in this encounter



Results

* Electrocardiogram (ECG) (2021 10:31 AM CST)



    



              Component    Value        Ref Range    Performed At  Pathologist



                                         Signature

 

    



                     QRSd                130                 [facility] 

 

    



                     QT                  428                 [facility] 

 

    



                     QTC                 445                 [facility] 

 

    



                     ECGHR               65                  [facility] 

 

    



                     ECGPR               188                 [facility] 











                                         Specimen

 









                                        Narrative

 

                                        



[facility] - 2021  8:53 AM CST



This result has an attachment that is not available.



               [facility]

                    

                    Test Date:  
2021

Pat Name:   JOELLE AZUL       Department:  [facility]

Patient ID:  [id number]         Room:     

Gender:    Male           Technician:  [id number]

:     1960        Requested By: OMER CHU 

Order Number: [id number]        Reading MD:  Omer Chu

                 Measurements

Intervals               Axis     

Rate:     65            P:      -36

AZ:      188           QRS:     -14

QRSD:     130           T:      155

QT:      428                  

QTc:     445                  

              Interpretive Statements

SINUS RHYTHM

LEFT BUNDLE BRANCH BLOCK

Electronically Signed On 2021 8:53:00 CST by Omer Chu







                                        Procedure Note

 

                                        



Omer Chu MD - 2021



Formatting of this note might be different from the original.

                             [facility]

                                       

                                       Test Date:    2021

Pat Name:     JOELLE AZUL             Department:   [facility]

Patient ID:   [id number]                 Room:         

Gender:       Male                     Technician:   [id number]

:          1960               Requested By: OMER CHU 

Order Number: [id number]                Reading MD:   Omer Chu

                                 Measurements

Intervals                              Axis          

Rate:         65                       P:            -36

AZ:           188                      QRS:          -14

QRSD:         130                      T:            155

QT:           428                                    

QTc:          445                                    

                           Interpretive Statements

SINUS RHYTHM

LEFT BUNDLE BRANCH BLOCK

Electronically Signed On 2021 8:53:00 CST by Omer Chu







   



                 Performing Organization  Address         City/State/ZIP Code  P

jason Number

 

   



                                         [facility]   





documented in this encounter



Visit Diagnoses









                                         Diagnosis

 





                                         Persistent atrial fibrillation - Primar

y



                                         Atrial fibrillation

 





                                         Typical atrial flutter (HCC)

 





                                         HOCM (hypertrophic obstructive cardiomy

opathy) (HCC)



                                         Hypertrophic obstructive cardiomyopathy

 





                                         Dyslipidemia



                                         Other and unspecified hyperlipidemia

 





                                         LBBB (left bundle branch block)



                                         Other left bundle branch block

 





                                         S/P ablation of atrial fibrillation



                                         Other postprocedural status

 





                                         S/P ablation of atrial flutter



                                         Other postprocedural status

 





                                         S/P ventricular septal myectomy

 





                                         S/P mitral valve repair



                                         Other postprocedural status

 





                                         S/P left atrial appendage ligation



documented in this encounter



Care Teams





                          Start Date                End Date



                     Team Member         Relationship        Specialty  

 

                          16                   



                     Mary Ellen Barron ARNP  PCP - General       Nurse  



                           [address]           Practitioner  



                                         [place] [address]    



                                         [phone] (Work)    



                                         [phone] (Fax)    



documented as of this encounter

## 2022-01-21 NOTE — TELE-ICU CONSULT
History of Present Illness


History of Present Illness


Date Seen by Provider:  Jan 21, 2022


Time Seen by Provider:  17:41


Date of Admission








Allergies and Home Medications


Allergies


Coded Allergies:  


     No Known Drug Allergies (Unverified , 1/21/22)





Review of Systems


Constitutional:  see HPI





Focused Exam


Height, Weight, BMI


Height: '"


Weight: lbs. oz. kg; 37.74 BMI


Method:





Exam


Exam


Patient acknowledged, consented, and participated in this virtual visit which 

was conducted using real time audio/video


Vital Signs








  Date Time  Temp Pulse Resp B/P (MAP) Pulse Ox O2 Delivery O2 Flow Rate FiO2


 


1/21/22 17:32  81      


 


1/21/22 17:00  80 21 125/69 93 Nasal Cannula 2.00 








Height & Weight


Height: '"


Weight: lbs. oz. kg; 37.74 BMI


Method:


General Appearance:  No Apparent Distress





Assessment/Plan


Assessment/Plan








(Tele-ICU Physician ,  consultation) 





Available chart/ vitals / labs / Images reviewed 





H&P is from discussin with Dr Yancey 





Patient's information available about PMH, Shx, Fhx  allergy reviewed in EMR.  


ROS as per chart and RN report  





Now in ICU, hemodynamically stable  


Video assessment done using  teleICU camera, rest of exam as per RN  





Discussed with RN. 





Consultants:  [facility]





Hospital course: 


1/21 - tranfef fron other facility  with Covid PNA , on NC o2 








A/P 





AHRF due to  COVID19 


- monitor closely 


-prone position if able 


- conservative fluid strategy (aim for even or negative fluid balance  if ok 

with cards 





SARS-Coronavirus-2/COVID-19 PNA 


( Symptom onset ~1/  10 ,  DX 1/ 18  unvaccinted  


    --Dexamethasone 1/ 


    -  ? candidate for Tocilizumab


-Hypercoagulable state  -->lovenox  proph dose  





Suspected superimposed bact PNA  


-empiric abx started on   1/20 





h/o hypertrophic CM 


- cards consulted 





 COPD/ astma 


= - br - dilators ., monitor  if needs increased stweropid dose 








 


Lines :    (Central Line Necessity Reviewed) 


Villagomez: 


OG: 


Nutrition:  


Analgesia:  


Anxiety/ delirium  


VTE Prophylaxis:  lov 40 


Stress Ulcer Prophylaxis:  po 





Plans in collaboration with  bedside consultants and IM MDs. 


Discussed with RN to reach out if any questions or concerns 


A total of 31  minutes of critical care time was devoted to this patient today, 

required to treat and/or prevent further deterioration of critical care 

condition ( as above ) .











ARMIDA PARRY MD         Jan 21, 2022 17:45

## 2022-01-21 NOTE — XMS REPORT
Encounter Summary

                             Created on: 2022



Carlos Azul

External Reference #: [id number]

: 1960

Sex: Male



Demographics





                          Address                    [address] [place] [address]  [address]

 

                          Home Phone                [phone]

 

                          Preferred Language        English

 

                          Marital Status            

 

                          Restorationism Affiliation     [id number]

 

                          Race                      White

 

                          Ethnic Group              Not  or 





Author





                          Author                    [facility]

 

                          Organization              [facility]

 

                          Address                   Unknown

 

                          Phone                     Unavailable







Support





                Name            Relationship    Address         Phone

 

                    Jordana Azul       EC[employer]                 [address] [place] [address]  [address]                      [phone]







Care Team Providers





                    Care Team Member Name Role                Phone

 

                    Mary Ellen Barron PCP                 [phone]







Encounter Details





                          Care Team                 Description



                     Date                Type                Department  

 

                                        



Yoan Chu MD



[address]



[address]



[place] [address]



[phone] (Work)



[phone] (Fax)                       



                     2021          Documentation       [facility]  



                                         [facility]  



                                         [facility]  



                                         [address]  



                                         [address]  



                                         [place] [address]  



                                         [phone]  







Social History





                                        Date



                 Tobacco Use     Types           Packs/Day       Years Used 

 

                                        Quit: 1976



                 Former Smoker   Cigars,         0.5             20 



                                         Cigarettes   

 

    



                     Smokeless Tobacco: Former  Chew                Quit: 1990



                                         User   







                                        Comments



                           Alcohol Use               Standard Drinks/Week 

 

                                         



                           Yes                       0 (1 standard drink = 0.6 o

z pure alcohol) 







 



                           Sex Assigned at Birth     Date Recorded

 

 



                                         Not on file 



documented as of this encounter



Plan of Treatment





Not on filedocumented as of this encounter



Goals





     



            Goal       Patient    Associated  Recent Progress  Patient-Stat  Aut

hor



                     Goal Type           Problems            ed? 

 

     



              Blood Pressure < 140/90  Blood        124/97 (2021  No      

     Giocon,



                     Pressure            10:38 AM CST)       Yoan BELLO MD



documented as of this encounter



Visit Diagnoses

Not on filedocumented in this encounter



Care Teams





                          Start Date                End Date



                     Team Member         Relationship        Specialty  

 

                          16                   



                     Mary Ellen Barron ARNP  PCP - General       Nurse  



                           [address]           Practitioner  



                                         [place] [address]    



                                         [phone] (Work)    



                                         [phone] (Fax)    



documented as of this encounter

## 2022-01-21 NOTE — DIAGNOSTIC IMAGING REPORT
INDICATION: Cough, Covid. 



FINDINGS: Patchy bilateral infiltrates largely peripheral

consistent with nonspecific infectious etiology including Covid.

No effusion, pneumothorax or clark failure pattern. Sternal wires

midline. The heart is mildly enlarged but no vascular congestion.



IMPRESSION: Multifocal bilateral infiltrates. Upper limits heart

size but no clark failure pattern or pleural fluid. 



Dictated by: 



  Dictated on workstation # [id number]

## 2022-01-22 LAB
ALBUMIN SERPL-MCNC: 3.3 GM/DL (ref 3.2–4.5)
ALP SERPL-CCNC: 115 U/L (ref 40–136)
ALT SERPL-CCNC: 72 U/L (ref 0–55)
BASOPHILS # BLD AUTO: 0 10^3/UL (ref 0–0.1)
BASOPHILS NFR BLD AUTO: 0 % (ref 0–10)
BILIRUB SERPL-MCNC: 0.9 MG/DL (ref 0.1–1)
BUN/CREAT SERPL: 17
CALCIUM SERPL-MCNC: 8.1 MG/DL (ref 8.5–10.1)
CHLORIDE SERPL-SCNC: 103 MMOL/L (ref 98–107)
CHOLEST SERPL-MCNC: 150 MG/DL (ref ?–200)
CO2 SERPL-SCNC: 20 MMOL/L (ref 21–32)
CREAT SERPL-MCNC: 0.71 MG/DL (ref 0.6–1.3)
EOSINOPHIL # BLD AUTO: 0 10^3/UL (ref 0–0.3)
EOSINOPHIL NFR BLD AUTO: 0 % (ref 0–10)
GFR SERPLBLD BASED ON 1.73 SQ M-ARVRAT: 104 ML/MIN
GLUCOSE SERPL-MCNC: 138 MG/DL (ref 70–105)
HCT VFR BLD CALC: 38 % (ref 40–54)
HDLC SERPL-MCNC: 28 MG/DL (ref 40–60)
HGB BLD-MCNC: 13.3 G/DL (ref 13.3–17.7)
LYMPHOCYTES # BLD AUTO: 0.9 10^3/UL (ref 1–4)
LYMPHOCYTES NFR BLD AUTO: 27 % (ref 12–44)
MAGNESIUM SERPL-MCNC: 2.1 MG/DL (ref 1.6–2.4)
MANUAL DIFFERENTIAL PERFORMED BLD QL: NO
MCH RBC QN AUTO: 32 PG (ref 25–34)
MCHC RBC AUTO-ENTMCNC: 35 G/DL (ref 32–36)
MCV RBC AUTO: 91 FL (ref 80–99)
MONOCYTES # BLD AUTO: 0.3 10^3/UL (ref 0–1)
MONOCYTES NFR BLD AUTO: 10 % (ref 0–12)
NEUTROPHILS # BLD AUTO: 2 10^3/UL (ref 1.8–7.8)
NEUTROPHILS NFR BLD AUTO: 63 % (ref 42–75)
PHOSPHATE SERPL-MCNC: 2.7 MG/DL (ref 2.3–4.7)
PLATELET # BLD: 132 10^3/UL (ref 130–400)
PMV BLD AUTO: 10.9 FL (ref 9–12.2)
POTASSIUM SERPL-SCNC: 4 MMOL/L (ref 3.6–5)
PROT SERPL-MCNC: 5.8 GM/DL (ref 6.4–8.2)
SODIUM SERPL-SCNC: 134 MMOL/L (ref 135–145)
TRIGL SERPL-MCNC: 88 MG/DL (ref ?–150)
VLDLC SERPL CALC-MCNC: 18 MG/DL (ref 5–40)
WBC # BLD AUTO: 3.2 10^3/UL (ref 4.3–11)

## 2022-01-22 RX ADMIN — ATORVASTATIN CALCIUM SCH MG: 10 TABLET, FILM COATED ORAL at 19:53

## 2022-01-22 RX ADMIN — ALBUTEROL SULFATE SCH GM: 90 AEROSOL, METERED RESPIRATORY (INHALATION) at 21:28

## 2022-01-22 RX ADMIN — SODIUM CHLORIDE SCH MLS/HR: 900 INJECTION INTRAVENOUS at 08:44

## 2022-01-22 RX ADMIN — ALPRAZOLAM PRN MG: 0.5 TABLET ORAL at 00:07

## 2022-01-22 RX ADMIN — ENOXAPARIN SODIUM SCH MG: 100 INJECTION SUBCUTANEOUS at 18:05

## 2022-01-22 RX ADMIN — DOCUSATE SODIUM SCH MG: 100 CAPSULE ORAL at 19:53

## 2022-01-22 RX ADMIN — GUAIFENESIN SCH MG: 600 TABLET, EXTENDED RELEASE ORAL at 19:53

## 2022-01-22 RX ADMIN — DOCUSATE SODIUM SCH MG: 100 CAPSULE ORAL at 08:46

## 2022-01-22 RX ADMIN — INSULIN ASPART SCH UNIT: 100 INJECTION, SOLUTION INTRAVENOUS; SUBCUTANEOUS at 05:05

## 2022-01-22 RX ADMIN — SODIUM CHLORIDE SCH MLS/HR: 900 INJECTION, SOLUTION INTRAVENOUS at 18:05

## 2022-01-22 RX ADMIN — INSULIN ASPART SCH UNIT: 100 INJECTION, SOLUTION INTRAVENOUS; SUBCUTANEOUS at 13:12

## 2022-01-22 RX ADMIN — INSULIN ASPART SCH UNIT: 100 INJECTION, SOLUTION INTRAVENOUS; SUBCUTANEOUS at 18:00

## 2022-01-22 RX ADMIN — SENNOSIDES SCH MG: 8.6 TABLET, FILM COATED ORAL at 08:43

## 2022-01-22 RX ADMIN — SENNOSIDES SCH MG: 8.6 TABLET, FILM COATED ORAL at 19:53

## 2022-01-22 RX ADMIN — SODIUM CHLORIDE SCH MLS/HR: 900 INJECTION INTRAVENOUS at 18:34

## 2022-01-22 RX ADMIN — GUAIFENESIN SCH MG: 600 TABLET, EXTENDED RELEASE ORAL at 08:43

## 2022-01-22 RX ADMIN — ALBUTEROL SULFATE SCH GM: 90 AEROSOL, METERED RESPIRATORY (INHALATION) at 09:13

## 2022-01-22 RX ADMIN — SODIUM CHLORIDE SCH MLS/HR: 900 INJECTION INTRAVENOUS at 02:07

## 2022-01-22 RX ADMIN — INSULIN ASPART SCH UNIT: 100 INJECTION, SOLUTION INTRAVENOUS; SUBCUTANEOUS at 19:54

## 2022-01-22 RX ADMIN — SODIUM CHLORIDE SCH MLS/HR: 900 INJECTION INTRAVENOUS at 13:17

## 2022-01-22 NOTE — TELE-ICU PROGRESS NOTE
Subjective


Date Seen by a Provider:  2022


Time Seen by a Provider:  08:50


Subjective/Events-last exam


This virtual visit was conducted using real time audio/video. 


Thank you for asking us to see this patient for respiratory insufficiency due to

Covid pna. Unvaccinated.


Recent events: Transferred fro outside ER 2022. Placed back on NC 2 LPM 

this AM.


PE: Obese, alert. VSS. O2 sat 98% on 2L


HEENT: No obvious masses, adenopathy or JVD. 


Chest: clear to auscultation. Diminished.


CV: RRR S1 S2 No murmur or added sounds. 


Abd: Non-tender. Bowel sounds Y. 


: Unremarkable. Villagomez N. 


CNS/psychiatric: Grossly intact. No obvious focal findings. 


Extremities: edema. Capillary refill < 3 seconds. 


Skin: unremarkable. 


Results:  Decreased WCC 3.2. B.36/45/33. CTAC: No PE, B GGOs present. 


Available chart/ vitals / labs / images reviewed.


Video assessment done using teleICU camera, rest of exam as per RN. 


A/P: Respiratory insufficiency: Continue present management with O2,  BDs.


Monitor for increasing oxygenation needs and/or need for intubation. 


Critical Care: critically ill patient. Cont. Dex., Lov., Cefip., AZT, SSI.


Discussed with RANCHO Case. Asked RN to reach out to eICU if any questions or 

concerns later. Time spent with patient/coordination of care with other health 

professionals (mins): 25





Sepsis Event


Evaluation


Height, Weight, BMI


Height: '"


Weight: lbs. oz. kg; 37.74 BMI


Method:





Exam


Exam


Patient acknowledged, consented, and participated in this virtual visit which 

was conducted using real time audio/video


Vital Signs








  Date Time  Temp Pulse Resp B/P (MAP) Pulse Ox O2 Delivery O2 Flow Rate FiO2


 


22 09:00  71 30  90 Room Air  


 


22 08:46      Room Air  


 


22 08:08 35.8       


 


22 07:00  58      


 


22 07:00  58 18 135/80 94 Nasal Cannula 2.00 


 


22 06:00  56 25 126/88 98 Nasal Cannula 2.00 


 


22 05:00  64 25 143/90 99 Nasal Cannula 2.00 


 


22 04:00     96 Nasal Cannula 2.00 


 


22 04:00  70 24 121/72 96 Nasal Cannula 2.00 


 


22 03:00  64 26 130/70 97 Nasal Cannula 2.00 


 


22 02:00  70 25 137/83 90 Nasal Cannula 2.00 


 


22 01:00  73 26 124/90 94 Nasal Cannula 2.00 


 


22 01:00  70      


 


22 00:00  68 29 156/93 94 Nasal Cannula 2.00 


 


22 23:59     94 Nasal Cannula 2.00 


 


22 23:00  70 33 131/101 91 Nasal Cannula 2.00 


 


22 22:00  75 38 138/80 92 Nasal Cannula 2.00 


 


22 21:00  70 15 147/89 97 Nasal Cannula 2.00 


 


22 20:39 36.2       


 


22 20:35  80   93   21


 


22 20:00  78 25 125/73 93 Nasal Cannula 2.00 


 


22 20:00     97 Nasal Cannula 2.00 


 


22 19:00  87 25 121/77 95 Nasal Cannula 2.00 


 


22 19:00  85      


 


22 18:00  80 22 132/89 92 Nasal Cannula 2.00 


 


22 17:32  81      


 


22 17:20      Nasal Cannula 2.00 


 


22 17:00  80 21 125/69 93 Nasal Cannula 2.00 














I & O 


 


 22





 07:00


 


Intake Total 1520 ml


 


Output Total 600 ml


 


Balance 920 ml








Height & Weight


Height: '"


Weight: lbs. oz. kg; 37.74 BMI


Method:


General Appearance:  No Apparent Distress, Anxious, Chronically ill, Obese


HEENT:  PERRL/EOMI, Normal ENT Inspection, Pharynx Normal


Neck:  Full Range of Motion, Normal Inspection, Non Tender, Supple, Carotid 

Bruit


Respiratory:  No Respiratory Distress, Accessory Muscle Use, Decreased Breath 

Sounds


Cardiovascular:  Regular Rate, Rhythm, No Edema, Normal Peripheral Pulses


Extremity:  Normal Capillary Refill, Normal Inspection, Normal Range of Motion, 

Non Tender, No Calf Tenderness, No Pedal Edema


Neurologic/Psychiatric:  Alert, Oriented x3, No Motor/Sensory Deficits, Normal 

Mood/Affect


Skin:  Normal Color, Warm/Dry


Lymphatic:  No Adenopathy





Results


Lab


Laboratory Tests


22 17:37








22 04:07











Assessment/Plan


Assessment/Plan


See free text.


Critical Care:  Critically Ill Patient











ALEA GUTIERREZ MD          2022 10:10

## 2022-01-22 NOTE — PROGRESS NOTE
Subjective


Date Seen by a Provider:  Jan 22, 2022


Time Seen by a Provider:  11:30


Subjective/Events-last exam


Patient doing well


No increased oxygen requirements


Ready for transfer to 4th floor


Checked meds and labs


No pain is reported


Echocardiogram reviewed


Likely sleep apnea causes desat at night


Review of Systems


Pulmonary:  Dyspnea, Cough





Objective


Exam


Last Set of Vital Signs





Vital Signs








  Date Time  Temp Pulse Resp B/P (MAP) Pulse Ox O2 Delivery O2 Flow Rate FiO2


 


1/22/22 06:00  56 25 126/88 98 Nasal Cannula 2.00 


 


1/21/22 20:39 36.2       


 


1/21/22 20:35        21





Capillary Refill :


I&O











Intake and Output 


 


 1/22/22





 00:00


 


Intake Total 620 ml


 


Output Total 0 ml


 


Balance 620 ml


 


 


 


Intake Oral 620 ml


 


Output Urine Total 0 ml


 


# Voids 1


 


Daily Weight Change No








General:  Alert, Oriented X3, Cooperative, No Acute Distress


Lungs:  Clear to Auscultation, Normal Air Movement, Other (Diminished breath 

sounds)


Heart:  Regular Rate


Abdomen:  Normal Bowel Sounds


Psych/Mental Status:  Mental Status NL





Results


Lab


Laboratory Tests


1/21/22 17:37: 


Sodium Level 133L, Potassium Level 3.9, Chloride Level 101, Carbon Dioxide Level

21, Anion Gap 11, Blood Urea Nitrogen 10, Creatinine 0.64, Estimat Glomerular 

Filtration Rate 108, BUN/Creatinine Ratio 16, Glucose Level 130H, Calcium Level 

8.2L


1/21/22 18:05: 


Blood Gas Puncture Site RRAD, Blood Gas Patient Temperature 37, Arterial Blood 

pH 7.36L, Arterial Blood Partial Pressure CO2 45, Arterial Blood Partial Pressu

re O2 33*L, Arterial Blood HCO3 25, Arterial Blood Total CO2 25.9, Arterial 

Blood Oxygen Saturation 50L, Arterial Blood Base Excess -0.2, Carlos Eduardo Test POS, 

Blood Gas Ventilator Setting NO, Blood Gas Inspired Oxygen 2


1/21/22 20:13: Glucometer 180H


1/22/22 04:07: 


Sodium Level 134L, Potassium Level 4.0, Chloride Level 103, Carbon Dioxide Level

20L, Anion Gap 11, Blood Urea Nitrogen 12, Creatinine 0.71, Estimat Glomerular 

Filtration Rate 104, BUN/Creatinine Ratio 17, Glucose Level 138H, Calcium Level 

8.1L, White Blood Count 3.2L, Red Blood Count 4.15L, Hemoglobin 13.3, Hematocrit

38L, Mean Corpuscular Volume 91, Mean Corpuscular Hemoglobin 32, Mean 

Corpuscular Hemoglobin Concent 35, Red Cell Distribution Width 11.6, Platelet 

Count 132, Mean Platelet Volume 10.9, Immature Granulocyte % (Auto) 0, 

Neutrophils (%) (Auto) 63, Lymphocytes (%) (Auto) 27, Monocytes (%) (Auto) 10, 

Eosinophils (%) (Auto) 0, Basophils (%) (Auto) 0, Neutrophils # (Auto) 2.0, 

Lymphocytes # (Auto) 0.9L, Monocytes # (Auto) 0.3, Eosinophils # (Auto) 0.0, 

Basophils # (Auto) 0.0, Immature Granulocyte # (Auto) 0.0, Percent Immature 

Platelet Fraction 5.2, Corrected Calcium 8.7, Phosphorus Level 2.7, Magnesium 

Level 2.1, Total Bilirubin 0.9, Aspartate Amino Transf (AST/SGOT) 63H, Alanine 

Aminotransferase (ALT/SGPT) 72H, Alkaline Phosphatase 115, Total Protein 5.8L, 

Albumin 3.3





Assessment/Plan


Assessment/Plan


Assess & Plan/Chief Complaint


Assessment:


Acute hypoxic respiratory failure


COVID-19 pneumonia


Presumed ANDREW undiagnosed


Obesity BMI 37


Hypertrophic cardiomyopathy management [facility]


Hypertension





Plan:


COVID-19 protocol


May be a candidate for Actemra if progresses


High risk for intubation


Echo


Cardiology appreciated


eICU appreciated





1/22/2022:


Supportive care


Transfer to 4th floor


Appreciate cardiology


Oxygen wean





Diagnosis/Problems


Diagnosis/Problems





(1) COVID


(2) Hypertrophic cardiomyopathy


(3) Obesity (BMI 30-39.9)


(4) Hypertension


(5) Hyperlipidemia


(6) ANDREW (obstructive sleep apnea)











MANISH MCKEON DO                Jan 22, 2022 06:54

## 2022-01-22 NOTE — TELE-ICU PROGRESS NOTE
Subjective


Date Seen by a Provider:  2022


Time Seen by a Provider:  09:15


Subjective/Events-last exam


This virtual visit was conducted using real time audio/video. 


Thank you for asking us to see this patient for respiratory insufficiency due to

delirium following suicide attempt.


Recent events: None overnight.





PE: Sedated on vent. VSS. O2 sat 95% on 30%/+5.


HEENT: No obvious masses, adenopathy or JVD. 


Chest: clear to auscultation. 


CV: RRR S1 S2 No murmur or added sounds. 


Abd: Non-tender. Bowel sounds Y. 


: Unremarkable. Villagomez Y. 


CNS/psychiatric: Grossly intact. No obvious focal findings. 


Extremities: No edema. Capillary refill < 3 seconds. 


Skin: unremarkable. 


Results: Decreased Hb 8.8, Alb 2.9. B.4/38/87.  


Available chart/ vitals / labs / images reviewed.


Video assessment done using teleICU camera, rest of exam as per RN. 


A/P: Respiratory insufficiency: Continue current vent settings as pt detox. 

proceeds. Cont Prop., Prec., Fent. 


Critical Care: critically ill patient. Cont. thiamine, folate, PPI.


Discussed with RN Evie. Asked RN to reach out to eICU if any questions or c

oncerns later. Time spent with patient/coordination of care with other health 

professionals (mins): 15.





Sepsis Event


Evaluation


Height, Weight, BMI


Height: '"


Weight: lbs. oz. kg; 37.74 BMI


Method:





Exam


Exam


Patient acknowledged, consented, and participated in this virtual visit which 

was conducted using real time audio/video


Vital Signs








  Date Time  Temp Pulse Resp B/P (MAP) Pulse Ox O2 Delivery O2 Flow Rate FiO2


 


22 09:00  71 30  90 Room Air  


 


22 08:46      Room Air  


 


22 08:08 35.8       


 


22 07:00  58      


 


22 07:00  58 18 135/80 94 Nasal Cannula 2.00 


 


22 06:00  56 25 126/88 98 Nasal Cannula 2.00 


 


22 05:00  64 25 143/90 99 Nasal Cannula 2.00 


 


22 04:00     96 Nasal Cannula 2.00 


 


22 04:00  70 24 121/72 96 Nasal Cannula 2.00 


 


22 03:00  64 26 130/70 97 Nasal Cannula 2.00 


 


22 02:00  70 25 137/83 90 Nasal Cannula 2.00 


 


22 01:00  73 26 124/90 94 Nasal Cannula 2.00 


 


22 01:00  70      


 


22 00:00  68 29 156/93 94 Nasal Cannula 2.00 


 


22 23:59     94 Nasal Cannula 2.00 


 


22 23:00  70 33 131/101 91 Nasal Cannula 2.00 


 


22 22:00  75 38 138/80 92 Nasal Cannula 2.00 


 


22 21:00  70 15 147/89 97 Nasal Cannula 2.00 


 


22 20:39 36.2       


 


22 20:35  80   93   21


 


22 20:00  78 25 125/73 93 Nasal Cannula 2.00 


 


22 20:00     97 Nasal Cannula 2.00 


 


22 19:00  87 25 121/77 95 Nasal Cannula 2.00 


 


22 19:00  85      


 


22 18:00  80 22 132/89 92 Nasal Cannula 2.00 


 


22 17:32  81      


 


22 17:20      Nasal Cannula 2.00 


 


22 17:00  80 21 125/69 93 Nasal Cannula 2.00 














I & O 


 


 22





 07:00


 


Intake Total 1520 ml


 


Output Total 600 ml


 


Balance 920 ml








Height & Weight


Height: '"


Weight: lbs. oz. kg; 37.74 BMI


Method:


General Appearance:  No Apparent Distress, Anxious, Chronically ill, Obese


HEENT:  PERRL/EOMI, Normal ENT Inspection, Pharynx Normal


Neck:  Full Range of Motion, Normal Inspection, Non Tender, Supple, Carotid 

Bruit


Respiratory:  No Respiratory Distress, Accessory Muscle Use, Decreased Breath 

Sounds


Cardiovascular:  Regular Rate, Rhythm, No Edema, Normal Peripheral Pulses


Extremity:  Normal Capillary Refill, Normal Inspection, Normal Range of Motion, 

Non Tender, No Calf Tenderness, No Pedal Edema


Neurologic/Psychiatric:  Alert, Oriented x3, No Motor/Sensory Deficits, Normal 

Mood/Affect


Skin:  Normal Color, Warm/Dry


Lymphatic:  No Adenopathy





Results


Lab


Laboratory Tests


22 17:37








22 04:07











Assessment/Plan


Assessment/Plan


See free text


Critical Care:  Ventilator Management











ALEA GUTIERREZ MD          2022 10:16

## 2022-01-22 NOTE — CONSULTATION-CARDIOLOGY
HPI-Cardiology


Cardiology Consultation:


Date of Consultation


01/22/2022


Date of Admission


01/21/2022


Attending Physician


Christie Yancey DO


Admitting Physician


Anju Yancey DO


Consulting Physician


СВЕТЛАНА WARREN JR, MD





HPI:


Time Seen by a Provider:  10:14


Chief Complaint:


Reason for consultation: Hypertrophic cardiomyopathy.


I had the pleasure of seeing Carlos this morning in the intensive care unit at 

[facility] in [place], [place].  He is an unvaccinated person who was 

diagnosed with COVID infection earlier this week.  He also has a history of 

hypertrophic cardiomyopathy with a previous surgical as well as percutaneous 

myomectomy followed by paroxysmal atrial fibrillation with what he describes as 

2 separate ablations in the past.  He follows with a cardiologist at [facility] in [place] [address].  Last week he started feeling like he had an upper 

respiratory infection.  The symptoms lasted for a couple of days then resolved. 

However, earlier this week he started having a cough with shortness of breath.  

He had general malaise.  He went to the [facility] clinic in 

[place], [place] and had a COVID test which was positive.  He was told to take 

over-the-counter medications for this and stay home and rest.  However, over the

next couple of days, his shortness of breath worsened.  His cough persisted.  He

was having paroxysmal nocturnal dyspnea and orthopnea.  Yesterday he went to 

[facility] emergency room and was then transferred to our facility 

for further treatment and evaluation.  He denies any chest discomfort.  When he 

had atrial fibrillation in the past, he would be very lightheaded and short of 

breath out of proportion to his level of exertion.  He has not had any of these 

symptoms other than the shortness of breath from the COVID infection.  He denies

palpitations, syncope, or ankle edema.  Because of his cardiac history, a 

cardiology consultation was requested.





Certain portions of this document may have been dictated utilizing voice 

recognition technology.  Inherent to this technology, typographical and 

grammatical errors may exist.  As much as I am diligent to identify and correct 

these mistakes, some errors may remain in the document.





Review of Systems-Cardiology


Review of Systems


Other comments


Review of 10 organ systems is as per the history of present illness, otherwise 

negative.





All Other Systems Reviewed


Negative Unless Noted:  Yes





PMH-Social-Family Hx


Patient Social History


Marrital Status:  


Employed/Student:  employed ([employer] )


Smoking Status:  Never a Smoker


Have you traveled recently?:  No


Alcohol Use?:  Yes





Past Medical History


PMH


As described under Assessment.





Family Medical History


Family Medical History:  


The patient does not know of any family history of premature coronary


artery disease in first-degree relatives.





Allergies and Home Medications


Allergies


Coded Allergies:  


     No Known Drug Allergies (Unverified , 1/21/22)





Patient Home Medication List


Home Medication List Reviewed:  Yes


Metoprolol Succinate (Metoprolol Succinate) 100 Mg Tab.er.24h, 100 MG PO DAILY, 

(Reported)


   Entered as Reported by: CHRISTIE YANCEY on 1/21/22 1839


   Last Action: Continued





Exam


Vital Signs





Vital Signs








  Date Time  Temp Pulse Resp B/P (MAP) Pulse Ox O2 Delivery O2 Flow Rate FiO2


 


1/22/22 09:00  71 30  90 Room Air  


 


1/22/22 08:08 35.8       


 


1/22/22 07:00    135/80   2.00 


 


1/21/22 20:35        21








Physical Exam


Due to the patient's COVID status, I viewed the patient from the doorway.  I did

not approach the patient close enough to do a complete physical exam.


General: The patient is not on a ventilator.  He was sitting up in bed 

conversing comfortably.  He is obese.


HENT: Normocephalic.  Atraumatic.


Pulmonary: Breathing comfortably on no support devices.


Skin: There is no pallor.


Neurologic: Alert and oriented x3.  Cranial nerves III through XII appear 

grossly intact.  The patient has good motor tone in the upper and lower ex

tremities bilaterally.


Psychiatric: Pleasant and has a normal affect.


Labs





Laboratory Tests








Test


 1/21/22


17:37 1/21/22


18:05 1/21/22


20:13 1/22/22


04:07 Range/Units


 


 


Sodium Level 133 L   134 L 135-145  MMOL/L


 


Potassium Level 3.9    4.0  3.6-5.0  MMOL/L


 


Chloride Level 101    103    MMOL/L


 


Carbon Dioxide Level 21    20 L 21-32  MMOL/L


 


Anion Gap 11    11  5-14  MMOL/L


 


Blood Urea Nitrogen 10    12  7-18  MG/DL


 


Creatinine


 0.64 


 


 


 0.71 


 0.60-1.30


MG/DL


 


Estimat Glomerular Filtration


Rate 108 


 


 


 104 


  





 


BUN/Creatinine Ratio 16    17   


 


Glucose Level 130 H   138 H   MG/DL


 


Calcium Level 8.2 L   8.1 L 8.5-10.1  MG/DL


 


Blood Gas Puncture Site  RRAD     


 


Blood Gas Patient Temperature  37     


 


Arterial Blood pH  7.36 L   7.37-7.43  


 


Arterial Blood Partial


Pressure CO2 


 45 


 


 


 35-45  MMHG





 


Arterial Blood Partial


Pressure O2 


 33 *L


 


 


 79-93  MMHG





 


Arterial Blood HCO3  25    23-27  MMOL/L


 


Arterial Blood Total CO2


 


 25.9 


 


 


 21.0-31.0


MMOL/L


 


Arterial Blood Oxygen


Saturation 


 50 L


 


 


   %





 


Arterial Blood Base Excess


 


 -0.2 


 


 


 -2.5-2.5


MMOL/L


 


Carlos Eduardo Test  POS     


 


Blood Gas Ventilator Setting  NO     


 


Blood Gas Inspired Oxygen  2     


 


Glucometer   180 H    MG/DL


 


White Blood Count


 


 


 


 3.2 L


 4.3-11.0


10^3/uL


 


Red Blood Count


 


 


 


 4.15 L


 4.30-5.52


10^6/uL


 


Hemoglobin    13.3  13.3-17.7  g/dL


 


Hematocrit    38 L 40-54  %


 


Mean Corpuscular Volume    91  80-99  fL


 


Mean Corpuscular Hemoglobin    32  25-34  pg


 


Mean Corpuscular Hemoglobin


Concent 


 


 


 35 


 32-36  g/dL





 


Red Cell Distribution Width    11.6  10.0-14.5  %


 


Platelet Count


 


 


 


 132 


 130-400


10^3/uL


 


Mean Platelet Volume    10.9  9.0-12.2  fL


 


Immature Granulocyte % (Auto)    0   %


 


Neutrophils (%) (Auto)    63  42-75  %


 


Lymphocytes (%) (Auto)    27  12-44  %


 


Monocytes (%) (Auto)    10  0-12  %


 


Eosinophils (%) (Auto)    0  0-10  %


 


Basophils (%) (Auto)    0  0-10  %


 


Neutrophils # (Auto)


 


 


 


 2.0 


 1.8-7.8


10^3/uL


 


Lymphocytes # (Auto)


 


 


 


 0.9 L


 1.0-4.0


10^3/uL


 


Monocytes # (Auto)


 


 


 


 0.3 


 0.0-1.0


10^3/uL


 


Eosinophils # (Auto)


 


 


 


 0.0 


 0.0-0.3


10^3/uL


 


Basophils # (Auto)


 


 


 


 0.0 


 0.0-0.1


10^3/uL


 


Immature Granulocyte # (Auto)


 


 


 


 0.0 


 0.0-0.1


10^3/uL


 


Percent Immature Platelet


Fraction 


 


 


 5.2 


 0.0-7.6  %





 


Corrected Calcium    8.7  8.5-10.1  MG/DL


 


Phosphorus Level    2.7  2.3-4.7  MG/DL


 


Magnesium Level    2.1  1.6-2.4  MG/DL


 


Total Bilirubin    0.9  0.1-1.0  MG/DL


 


Aspartate Amino Transf


(AST/SGOT) 


 


 


 63 H


 5-34  U/L





 


Alanine Aminotransferase


(ALT/SGPT) 


 


 


 72 H


 0-55  U/L





 


Alkaline Phosphatase    115    U/L


 


Total Protein    5.8 L 6.4-8.2  GM/DL


 


Albumin    3.3  3.2-4.5  GM/DL








Radiology





ECHOCARDIOGRAM (01/22/2022):


1. Left ventricle: The cavity size is normal. There is severe concentric 

hypertrophy but more evident in the septum. Systolic function is normal. The 

estimated ejection fraction is 65-70%. There are no regional wall motion 

abnormalities identified. There is no evidence of systolic anterior motion of 

the mitral valve or significant elevation of the left ventricular outflow 

gradients. Features are consistent with a pseudonormal left ventricular filling 

pattern, with concomitant abnormal relaxation and increased filling pressure 

(grade 2 diastolic dysfunction).


2. Left atrium: The left atrium is severely dilated with a volume index of up to

58 mL/m.


3. Right atrium: The right atrium is severely dilated with an area of 32 cm.


4. Mitral valve: The annulus is mildly calcified.


5. Aortic valve: There is mild aortic valve sclerosis.


6. Pulmonary arteries: The pulmonary artery pressure cannot be estimated on this

study due to inadequate tricuspid regurgitant envelope.





ECG Impression


ECG


Comment


Sinus rhythm with frequent premature ventricular complexes, first-degree AV 

block and nonspecific intraventricular conduction delay.





Diagnosis/Problems


Diagnosis/Problems





(1) Hypertrophic cardiomyopathy


Assessment & Plan:  He seems to be asymptomatic with this and by his description

had a previous surgical and then percutaneous myomectomy/septal reduction.  The 

echocardiogram from today does not show any significant outflow obstruction.  I 

will resume his metoprolol succinate.  No additional cardiac testing is 

indicated at this time.  He can follow-up with his regular cardiologist at the 

outside facility following discharge.





(2) Paroxysmal atrial fibrillation


Assessment & Plan:  He has been in sinus rhythm with ventricular ectopy but no 

signs of atrial fibrillation.  He has significant biatrial dilatation as noted 

on his echocardiogram from today.  I have resumed his metoprolol succinate.  He 

was not on any antiarrhythmic drug or oral anticoagulation at home.  He states 

that his electrophysiologist discontinued his oral anticoagulation in the past. 

He will continue to follow with his electrophysiologist at the outside facility 

following discharge.





(3) Primary hypertension


Assessment & Plan:  I have resumed his metoprolol succinate.





(4) Mixed hyperlipidemia


Assessment & Plan:  I have ordered to resume atorvastatin and added a lipid 

panel to his previous blood sample.














СВЕТЛАНА WARREN JR, MD         Jan 22, 2022 10:20

## 2022-01-23 LAB
ALBUMIN SERPL-MCNC: 3.3 GM/DL (ref 3.2–4.5)
ALP SERPL-CCNC: 113 U/L (ref 40–136)
ALT SERPL-CCNC: 94 U/L (ref 0–55)
BASOPHILS # BLD AUTO: 0 10^3/UL (ref 0–0.1)
BASOPHILS NFR BLD AUTO: 0 % (ref 0–10)
BILIRUB SERPL-MCNC: 0.7 MG/DL (ref 0.1–1)
BUN/CREAT SERPL: 21
CALCIUM SERPL-MCNC: 8.2 MG/DL (ref 8.5–10.1)
CHLORIDE SERPL-SCNC: 106 MMOL/L (ref 98–107)
CO2 SERPL-SCNC: 22 MMOL/L (ref 21–32)
CREAT SERPL-MCNC: 0.72 MG/DL (ref 0.6–1.3)
EOSINOPHIL # BLD AUTO: 0 10^3/UL (ref 0–0.3)
EOSINOPHIL NFR BLD AUTO: 0 % (ref 0–10)
GFR SERPLBLD BASED ON 1.73 SQ M-ARVRAT: 104 ML/MIN
GLUCOSE SERPL-MCNC: 124 MG/DL (ref 70–105)
HCT VFR BLD CALC: 39 % (ref 40–54)
HGB BLD-MCNC: 13.3 G/DL (ref 13.3–17.7)
LYMPHOCYTES # BLD AUTO: 1 10^3/UL (ref 1–4)
LYMPHOCYTES NFR BLD AUTO: 16 % (ref 12–44)
MANUAL DIFFERENTIAL PERFORMED BLD QL: NO
MCH RBC QN AUTO: 32 PG (ref 25–34)
MCHC RBC AUTO-ENTMCNC: 35 G/DL (ref 32–36)
MCV RBC AUTO: 92 FL (ref 80–99)
MONOCYTES # BLD AUTO: 0.5 10^3/UL (ref 0–1)
MONOCYTES NFR BLD AUTO: 8 % (ref 0–12)
NEUTROPHILS # BLD AUTO: 4.7 10^3/UL (ref 1.8–7.8)
NEUTROPHILS NFR BLD AUTO: 76 % (ref 42–75)
PLATELET # BLD: 161 10^3/UL (ref 130–400)
PMV BLD AUTO: 11.1 FL (ref 9–12.2)
POTASSIUM SERPL-SCNC: 4.1 MMOL/L (ref 3.6–5)
PROT SERPL-MCNC: 5.8 GM/DL (ref 6.4–8.2)
SODIUM SERPL-SCNC: 137 MMOL/L (ref 135–145)
WBC # BLD AUTO: 6.2 10^3/UL (ref 4.3–11)

## 2022-01-23 RX ADMIN — GUAIFENESIN SCH MG: 600 TABLET, EXTENDED RELEASE ORAL at 07:56

## 2022-01-23 RX ADMIN — INSULIN ASPART SCH UNIT: 100 INJECTION, SOLUTION INTRAVENOUS; SUBCUTANEOUS at 06:12

## 2022-01-23 RX ADMIN — INSULIN ASPART SCH UNIT: 100 INJECTION, SOLUTION INTRAVENOUS; SUBCUTANEOUS at 11:33

## 2022-01-23 RX ADMIN — SODIUM CHLORIDE SCH MLS/HR: 900 INJECTION INTRAVENOUS at 13:01

## 2022-01-23 RX ADMIN — METOPROLOL SUCCINATE SCH MG: 100 TABLET, EXTENDED RELEASE ORAL at 07:56

## 2022-01-23 RX ADMIN — MELATONIN 3 MG ORAL TABLET SCH MG: 3 TABLET ORAL at 21:52

## 2022-01-23 RX ADMIN — SODIUM CHLORIDE SCH MLS/HR: 900 INJECTION INTRAVENOUS at 21:51

## 2022-01-23 RX ADMIN — FLUTICASONE PROPIONATE AND SALMETEROL SCH EACH: 113; 14 POWDER, METERED RESPIRATORY (INHALATION) at 21:42

## 2022-01-23 RX ADMIN — DOCUSATE SODIUM SCH MG: 100 CAPSULE ORAL at 07:56

## 2022-01-23 RX ADMIN — SENNOSIDES SCH MG: 8.6 TABLET, FILM COATED ORAL at 21:51

## 2022-01-23 RX ADMIN — DOCUSATE SODIUM SCH MG: 100 CAPSULE ORAL at 21:51

## 2022-01-23 RX ADMIN — ATORVASTATIN CALCIUM SCH MG: 10 TABLET, FILM COATED ORAL at 21:52

## 2022-01-23 RX ADMIN — INSULIN ASPART SCH UNIT: 100 INJECTION, SOLUTION INTRAVENOUS; SUBCUTANEOUS at 16:35

## 2022-01-23 RX ADMIN — ALPRAZOLAM PRN MG: 0.5 TABLET ORAL at 02:02

## 2022-01-23 RX ADMIN — MONTELUKAST SCH MG: 10 TABLET, FILM COATED ORAL at 13:00

## 2022-01-23 RX ADMIN — ENOXAPARIN SODIUM SCH MG: 100 INJECTION SUBCUTANEOUS at 16:35

## 2022-01-23 RX ADMIN — SODIUM CHLORIDE SCH MLS/HR: 900 INJECTION INTRAVENOUS at 06:29

## 2022-01-23 RX ADMIN — SODIUM CHLORIDE SCH MLS/HR: 900 INJECTION, SOLUTION INTRAVENOUS at 16:35

## 2022-01-23 RX ADMIN — GUAIFENESIN SCH MG: 600 TABLET, EXTENDED RELEASE ORAL at 21:51

## 2022-01-23 RX ADMIN — SODIUM CHLORIDE SCH MLS/HR: 900 INJECTION INTRAVENOUS at 02:00

## 2022-01-23 RX ADMIN — SENNOSIDES SCH MG: 8.6 TABLET, FILM COATED ORAL at 07:56

## 2022-01-23 RX ADMIN — ALBUTEROL SULFATE SCH PUFF: 90 AEROSOL, METERED RESPIRATORY (INHALATION) at 07:32

## 2022-01-23 RX ADMIN — ALPRAZOLAM SCH MG: 0.5 TABLET ORAL at 21:51

## 2022-01-23 RX ADMIN — INSULIN ASPART SCH UNIT: 100 INJECTION, SOLUTION INTRAVENOUS; SUBCUTANEOUS at 21:51

## 2022-01-23 RX ADMIN — ALBUTEROL SULFATE SCH PUFF: 90 AEROSOL, METERED RESPIRATORY (INHALATION) at 21:43

## 2022-01-23 NOTE — PROGRESS NOTE
Subjective


Date Seen by a Provider:  Jan 23, 2022


Time Seen by a Provider:  11:45


Subjective/Events-last exam


Patient doing a lot better


Cough is about the same and lungs are more coarse today


We will add Singulair and Claritin and inhaled corticosteroid


Bowels are moving


Urinating well


Tolerating COVID-19 treatment


Remains on 2-3 L of oxygen


Review of Systems


Pulmonary:  Dyspnea, Cough





Objective


Exam


Last Set of Vital Signs





Vital Signs








  Date Time  Temp Pulse Resp B/P (MAP) Pulse Ox O2 Delivery O2 Flow Rate FiO2


 


1/23/22 03:59 36.2 50 20 120/67 92 Nasal Cannula 1.00 


 


1/21/22 20:35        21





Capillary Refill : Less Than 3 Seconds


I&O











Intake and Output 


 


 1/23/22





 00:00


 


Intake Total 2970 ml


 


Output Total 1175 ml


 


Balance 1795 ml


 


 


 


Intake Oral 2670 ml


 


IV Total 300 ml


 


Output Urine Total 1175 ml


 


# Voids 3








General:  Alert, Oriented X3, Cooperative, No Acute Distress


Lungs:  Other (Coarse breath sounds all fields)


Heart:  Regular Rate


Psych/Mental Status:  Mental Status NL





Results


Lab


Laboratory Tests


1/22/22 10:52: Glucometer 155H


1/22/22 15:57: Glucometer 243H


1/22/22 17:38: Glucometer 140H


1/22/22 19:27: Glucometer 176H


1/23/22 05:57: Glucometer 131H


1/23/22 05:58: 


White Blood Count 6.2, Red Blood Count 4.19L, Hemoglobin 13.3, Hematocrit 39L, 

Mean Corpuscular Volume 92, Mean Corpuscular Hemoglobin 32, Mean Corpuscular 

Hemoglobin Concent 35, Red Cell Distribution Width 11.7, Platelet Count 161, 

Mean Platelet Volume 11.1, Immature Granulocyte % (Auto) 1, Neutrophils (%) 

(Auto) 76H, Lymphocytes (%) (Auto) 16, Monocytes (%) (Auto) 8, Eosinophils (%) 

(Auto) 0, Basophils (%) (Auto) 0, Neutrophils # (Auto) 4.7, Lymphocytes # (Auto)

1.0, Monocytes # (Auto) 0.5, Eosinophils # (Auto) 0.0, Basophils # (Auto) 0.0, 

Immature Granulocyte # (Auto) 0.0, Sodium Level 137, Potassium Level 4.1, 

Chloride Level 106, Carbon Dioxide Level 22, Anion Gap 9, Glucose Level 124H, 

Calcium Level 8.2L, Corrected Calcium 8.8, Total Bilirubin 0.7, Total Protein 5

.8L, Albumin 3.3





Assessment/Plan


Assessment/Plan


Assess & Plan/Chief Complaint


Assessment:


Acute hypoxic respiratory failure


COVID-19 pneumonia


Presumed ANDREW undiagnosed


Obesity BMI 37


Hypertrophic cardiomyopathy management [facility]


Hypertension





Plan:


COVID-19 protocol


May be a candidate for Actemra if progresses


High risk for intubation


Echo


Cardiology appreciated


eICU appreciated





1/22/2022:


Supportive care


Transfer to 4th floor


Appreciate cardiology


Oxygen wean





1/23/2022:


Add Singulair and Claritin


Much improved status





Diagnosis/Problems


Diagnosis/Problems





(1) COVID


(2) Hypertrophic cardiomyopathy


(3) Obesity (BMI 30-39.9)


(4) Hypertension


(5) Hyperlipidemia


(6) ANDREW (obstructive sleep apnea)











MANISH MCKEON DO                Jan 23, 2022 06:59

## 2022-01-24 LAB
ALBUMIN SERPL-MCNC: 3 GM/DL (ref 3.2–4.5)
ALP SERPL-CCNC: 104 U/L (ref 40–136)
ALT SERPL-CCNC: 111 U/L (ref 0–55)
BASOPHILS # BLD AUTO: 0 10^3/UL (ref 0–0.1)
BASOPHILS NFR BLD AUTO: 0 % (ref 0–10)
BILIRUB SERPL-MCNC: 0.6 MG/DL (ref 0.1–1)
BUN/CREAT SERPL: 20
CALCIUM SERPL-MCNC: 8.1 MG/DL (ref 8.5–10.1)
CHLORIDE SERPL-SCNC: 107 MMOL/L (ref 98–107)
CO2 SERPL-SCNC: 23 MMOL/L (ref 21–32)
CREAT SERPL-MCNC: 0.66 MG/DL (ref 0.6–1.3)
EOSINOPHIL # BLD AUTO: 0 10^3/UL (ref 0–0.3)
EOSINOPHIL NFR BLD AUTO: 0 % (ref 0–10)
GFR SERPLBLD BASED ON 1.73 SQ M-ARVRAT: 107 ML/MIN
GLUCOSE SERPL-MCNC: 105 MG/DL (ref 70–105)
HCT VFR BLD CALC: 38 % (ref 40–54)
HGB BLD-MCNC: 13.2 G/DL (ref 13.3–17.7)
LYMPHOCYTES # BLD AUTO: 1.2 10^3/UL (ref 1–4)
LYMPHOCYTES NFR BLD AUTO: 17 % (ref 12–44)
MANUAL DIFFERENTIAL PERFORMED BLD QL: NO
MCH RBC QN AUTO: 31 PG (ref 25–34)
MCHC RBC AUTO-ENTMCNC: 35 G/DL (ref 32–36)
MCV RBC AUTO: 90 FL (ref 80–99)
MONOCYTES # BLD AUTO: 0.6 10^3/UL (ref 0–1)
MONOCYTES NFR BLD AUTO: 9 % (ref 0–12)
NEUTROPHILS # BLD AUTO: 4.8 10^3/UL (ref 1.8–7.8)
NEUTROPHILS NFR BLD AUTO: 72 % (ref 42–75)
PLATELET # BLD: 160 10^3/UL (ref 130–400)
PMV BLD AUTO: 10.5 FL (ref 9–12.2)
POTASSIUM SERPL-SCNC: 4 MMOL/L (ref 3.6–5)
PROT SERPL-MCNC: 5.3 GM/DL (ref 6.4–8.2)
SODIUM SERPL-SCNC: 138 MMOL/L (ref 135–145)
WBC # BLD AUTO: 6.7 10^3/UL (ref 4.3–11)

## 2022-01-24 RX ADMIN — METOPROLOL SUCCINATE SCH MG: 100 TABLET, EXTENDED RELEASE ORAL at 08:01

## 2022-01-24 RX ADMIN — ALBUTEROL SULFATE SCH PUFF: 90 AEROSOL, METERED RESPIRATORY (INHALATION) at 19:00

## 2022-01-24 RX ADMIN — SODIUM CHLORIDE SCH MLS/HR: 900 INJECTION INTRAVENOUS at 07:58

## 2022-01-24 RX ADMIN — FLUTICASONE PROPIONATE AND SALMETEROL SCH EACH: 113; 14 POWDER, METERED RESPIRATORY (INHALATION) at 07:41

## 2022-01-24 RX ADMIN — ALBUTEROL SULFATE SCH PUFF: 90 AEROSOL, METERED RESPIRATORY (INHALATION) at 07:41

## 2022-01-24 RX ADMIN — MELATONIN 3 MG ORAL TABLET SCH MG: 3 TABLET ORAL at 21:31

## 2022-01-24 RX ADMIN — DOCUSATE SODIUM SCH MG: 100 CAPSULE ORAL at 21:00

## 2022-01-24 RX ADMIN — ATORVASTATIN CALCIUM SCH MG: 10 TABLET, FILM COATED ORAL at 21:30

## 2022-01-24 RX ADMIN — SENNOSIDES SCH MG: 8.6 TABLET, FILM COATED ORAL at 21:00

## 2022-01-24 RX ADMIN — DOCUSATE SODIUM SCH MG: 100 CAPSULE ORAL at 08:01

## 2022-01-24 RX ADMIN — SENNOSIDES SCH MG: 8.6 TABLET, FILM COATED ORAL at 08:01

## 2022-01-24 RX ADMIN — GUAIFENESIN SCH MG: 600 TABLET, EXTENDED RELEASE ORAL at 21:30

## 2022-01-24 RX ADMIN — INSULIN ASPART SCH UNIT: 100 INJECTION, SOLUTION INTRAVENOUS; SUBCUTANEOUS at 16:49

## 2022-01-24 RX ADMIN — METOPROLOL SUCCINATE SCH MG: 100 TABLET, EXTENDED RELEASE ORAL at 08:50

## 2022-01-24 RX ADMIN — INSULIN ASPART SCH UNIT: 100 INJECTION, SOLUTION INTRAVENOUS; SUBCUTANEOUS at 21:31

## 2022-01-24 RX ADMIN — MONTELUKAST SCH MG: 10 TABLET, FILM COATED ORAL at 08:01

## 2022-01-24 RX ADMIN — ALPRAZOLAM SCH MG: 0.5 TABLET ORAL at 21:31

## 2022-01-24 RX ADMIN — SODIUM CHLORIDE SCH MLS/HR: 900 INJECTION INTRAVENOUS at 03:11

## 2022-01-24 RX ADMIN — GUAIFENESIN SCH MG: 600 TABLET, EXTENDED RELEASE ORAL at 08:01

## 2022-01-24 RX ADMIN — FLUTICASONE PROPIONATE AND SALMETEROL SCH EACH: 113; 14 POWDER, METERED RESPIRATORY (INHALATION) at 19:01

## 2022-01-24 RX ADMIN — INSULIN ASPART SCH UNIT: 100 INJECTION, SOLUTION INTRAVENOUS; SUBCUTANEOUS at 12:32

## 2022-01-24 RX ADMIN — ENOXAPARIN SODIUM SCH MG: 100 INJECTION SUBCUTANEOUS at 16:49

## 2022-01-24 RX ADMIN — INSULIN ASPART SCH UNIT: 100 INJECTION, SOLUTION INTRAVENOUS; SUBCUTANEOUS at 06:21

## 2022-01-24 NOTE — PROGRESS NOTE
ELICEO ARRIAGA 1/24/22 1214:


Subjective


Date Seen by a Provider:  Jan 24, 2022


Time Seen by a Provider:  08:30


Subjective/Events-last exam


This is a 62 y/o male with a hx of COPD and T2DM on hospital day #4 for an 

exacerbation of COPD, complicated by COVID pneumonia. NAEON. Pt states that he 

is feeling better today and currently has no complaints. He states that he has 

been moving his bowel and bladder well, and reports a good appetite. He denies 

fever, chills, nausea, abdominal pain, or dyspnea. He has been satting well at 

90% on 3L NC.


Review of Systems


General:  No Chills, No Night Sweats


HEENT:  No Head Aches, No Visual Changes, No Ear Pain, No Dysphasia, No Sore 

Throat


Pulmonary:  No Dyspnea; Cough


Cardiovascular:  No: Chest Pain, Palpitations


Gastrointestinal:  No: Nausea, Vomiting, Abdominal Pain, Diarrhea, Constipation


Genitourinary:  No Dysuria, No Frequency


Neurological:  No: Confusion





Objective


Exam


Last Set of Vital Signs





Vital Signs








  Date Time  Temp Pulse Resp B/P (MAP) Pulse Ox O2 Delivery O2 Flow Rate FiO2


 


1/24/22 08:00     90 Nasal Cannula 3.00 


 


1/24/22 08:00 35.7 62 20 129/65    


 


1/21/22 20:35        21





Capillary Refill : Less Than 3 Seconds


I&O











Intake and Output 


 


 1/24/22





 00:00


 


Intake Total 1800 ml


 


Balance 1800 ml


 


 


 


Intake Oral 1800 ml


 


# Voids 5








General:  Alert, Oriented X3, Cooperative, No Acute Distress


HEENT:  Atraumatic, PERRLA, EOMI


Neck:  Supple


Lungs:  Normal Air Movement


Heart:  Regular Rate, No Murmurs, Gallops


Abdomen:  Soft, No Tenderness


Extremities:  No Cyanosis


Skin:  No Rashes


Neuro:  Normal Speech





Results


Lab


Laboratory Tests


1/23/22 15:16: Glucometer 243H


1/23/22 20:57: Glucometer 215H


1/24/22 06:10: Glucometer 115H


1/24/22 06:11: 


White Blood Count 6.7, Red Blood Count 4.21L, Hemoglobin 13.2L, Hematocrit 38L, 

Mean Corpuscular Volume 90, Mean Corpuscular Hemoglobin 31, Mean Corpuscular 

Hemoglobin Concent 35, Red Cell Distribution Width 11.8, Platelet Count 160, 

Mean Platelet Volume 10.5, Immature Granulocyte % (Auto) 2, Neutrophils (%) 

(Auto) 72, Lymphocytes (%) (Auto) 17, Monocytes (%) (Auto) 9, Eosinophils (%) 

(Auto) 0, Basophils (%) (Auto) 0, Neutrophils # (Auto) 4.8, Lymphocytes # (Auto)

1.2, Monocytes # (Auto) 0.6, Eosinophils # (Auto) 0.0, Basophils # (Auto) 0.0, 

Immature Granulocyte # (Auto) 0.1, Sodium Level 138, Potassium Level 4.0, 

Chloride Level 107, Carbon Dioxide Level 23, Anion Gap 8, Blood Urea Nitrogen 

13, Creatinine 0.66, Estimat Glomerular Filtration Rate 107, BUN/Creatinine 

Ratio 20, Glucose Level 105, Calcium Level 8.1L, Corrected Calcium 8.9, Total 

Bilirubin 0.6, Aspartate Amino Transf (AST/SGOT) 53H, Alanine Aminotransferase 

(ALT/SGPT) 111H, Alkaline Phosphatase 104, Total Protein 5.3L, Albumin 3.0L





Assessment/Plan


Assessment/Plan


Assess & Plan/Chief Complaint


This is a 62 y/o male with a hx of COPD and T2DM on hospital day #4 for an 

exacerbation of COPD, complicated by COVID pneumonia. He has been progressing 

well with treatment to this point, and is nearing time for discharge.





Diagnosis/Problems


Diagnosis/Problems





(1) COPD exacerbation


Status:  Acute


Assessment & Plan:  


- likely 2/2 COVID-19 pneumonia 


- currently satting 90% on 3 L NC


- c/w steroids and azithro through 01/25/2022


- c/w RCAT, IS. Resume home inhalers when appropriate.


- goal O2 sat 88-92%





(2) Hypertension


Status:  Chronic


Assessment & Plan:  


- Continue Metoprolol


Qualifiers:  


   Qualified Codes:  I10 - Essential (primary) hypertension


(3) Hyperlipidemia


Status:  Chronic


Assessment & Plan:  


 Continue statin


Qualifiers:  


   Qualified Codes:  E78.2 - Mixed hyperlipidemia





CHRISTIE YANCEY DO 1/25/22 0534:


Subjective


Subjective/Events-last exam


Pt much approved


On 3.5 L of oxygen


PT and OT will be ordered


Overall feels like he is doing better


Lungs are much improved no coarseness at all


Completing antibiotics


Review of Systems


General:  Fatigue


Pulmonary:  Dyspnea, Cough





Objective


Exam


General:  Alert, Oriented X3, Cooperative, No Acute Distress


Lungs:  Normal Air Movement


Neuro:  Normal Gait


Psych/Mental Status:  Mental Status NL





Assessment/Plan


Assessment/Plan


Assess & Plan/Chief Complaint


DC planned


Home O2 eval


Monitor closely





Supervisory-Addendum Brief


Verification & Attestation


Participated in pt care:  history, MDM, physical


Personally performed:  exam, history, MDM, supervision of care


Care discussed with:  Medical Student


Procedures:  n/a


Results interpretation:  Verified all documentation


Verification and Attestation of Medical Student E/M Service





A medical student performed and documented this service in my presence. I 

reviewed and verified all information documented by the medical student and made

modifications to such information, when appropriate. I personally performed the 

physical exam and medical decision making. 





 Christie Yancey, Jan 25, 2022,05:32











ELICEO ARRIAGA                Jan 24, 2022 12:14


CHRISTIE YANCEY DO                Jan 25, 2022 05:34

## 2022-01-24 NOTE — OCCUPATIONAL THERAPY EVAL
OT Evaluation-General/PLF


Medical Diagnosis


Admission Date


Jan 21, 2022 at 15:52


Medical Diagnosis:  COVID-19


Onset Date:  Jan 21, 2022





Therapy Diagnosis


Therapy Diagnosis:  N/A





Precautions


Precautions/Isolations:  Airborne Isolation, Contact Isolation, Droplet 

Isolation





Referral


Physician:  Tony Condon Reason:  Evaluation/Treatment





Medical History


Additional Medical History


asthma, COPD, hypertrophic cardiomyopathy


Current History


[place] [facility] [place] respiratory failure, COVID-19, 

unvaccinated. Transferred to [facility]  for higher level of care.





Social History


Current Living Status:  Spouse





ADL-Prior Level of Function


SCALE: Activities may be completed with or without assistive devices.





6-Indepedent-patient completes the activity by him/herself with no assistance 

from a helper.


5-Set-up or Clean-up Assistance-helper sets up or cleans up; patient completes 

activity. [place] assists only prior to or  


    following the activity.


4-Supervision or Touching Assistance-helper provides verbal cues and/or 

touching/steadying and/or contact guard assistance as patient completes 

activity. Assistance may be provided   


    throughout the activity or intermittently.


3-Partial/Moderate Assistance-helper does LESS THAN HALF the effort. [place] 

lifts, holds or supports trunk or limbs, but provides less than half the effort.


2-Substantial/Maximal Assistance-helper does MORE THAN HALF the effort. [place] 

lifts or holds trunk or limbs and provides more than half the effort.


2-Sxcpfhoqj-soiexr does ALL the effort. Patient does none of the effort to 

complete the activity. Or, the assistance of 2 or more helpers is required for 

the patient to complete the  


    activity.


If activity was not attempted, code reason:


7-Patient Refused.


9-Not Applicable-not attempted and the patient did not perform the activity 

before the current illness, exacerbation or injury.


10-Not Attempted due to Environmental Limitations-(lack of equipment, weather 

restraints, etc.).


88-Not Attempted due to Medical Conditions or Safety Concerns.


ADL PLOF Comments


Pt reports IND with ADLs and functional mobility, no AD/AE.


Self Care:  Independent


Functional Cognition:  Independent





OT Current Status


Subjective


Pt in bed, agreeable to OT Tx.





Mental Status/Objective


Patient Orientation:  Person, Place, Time, Situation


Attachments:  Oxygen





Current


Upper Extremity ROM


WFL


Upper Extremity Strength


WFL





ADL-Treatment


Eating (QC):  6 (Per pt report.)


Shower/Bathe Self (QC):  5 (Per pt report.)


Toileting Hygiene (QC):  6 (Per pt report.)





Other Treatments


Pt in bed, transferred supine to sit EOB independently, then performed 

functional mobility around his room, no AD independently. Pt picked up phone 

 from tray table, then got down in quadruped position on the floor in 

order to plug  into the bed. He got up from the floor independently. Pt 

walked around his bed, then transferred back to bed independently. Pt states he 

is able to go to the bathroom independently and took a shower yesterday without 

assistance. Pt is currently at PLOF. Post tx, pt in bed, call light in reach and

all needs met.





Education


OT Patient Education:  Correct positioning, Modified ADL techniques, Progress 

toward Goal/Update tx plan, Purpose of tx/functional activities, Rehab process


Teaching Recipient:  Patient


Teaching Methods:  Discussion


Response to Teaching:  Verbalize Understanding





OT Long Term Goals


Long Term Goals


1=Demonstrate adherence to instructed precautions during ADL tasks.


2=Patient will verbalize/demonstrate understanding of assistive 

devices/modifications for ADL.


3=Patient will improve strength/tolerance for activity to enable patient to 

perform ADL's.





OT Education/Plan


Problem List/Assessment


Assessment:  No Skilled OT Needs ID'd


No skilled OT services indicated at this time, as pt is independent with ADLs 

and functional mobility, no AD/AE and is at PLOF.





Discharge Recommendations


Plan/Recommendations:  Discharge/Goals Met





Treatment Plan/Plan of Care


Patient would benefit from OT for education, treatment and training to promote 

independence in ADL's, mobility, safety and/or upper extremity function for 

ADL's.


Plan of Care:  ADL Retraining, Functional Mobility


Treatment Duration:  Jan 24, 2022


Frequency:  1 time per week (eval only)


Estimated Hrs Per Day:  .25 hour per day


Rehab Potential:  Good





Time/GCodes


Start Time:  11:05


Stop Time:  11:15


Total Time Billed (hr/min):  10


Billed Treatment Time


1GILBERT ADDISON OT          Jan 24, 2022 11:32

## 2022-01-24 NOTE — CARDIOLOGY PROGRESS NOTE
Progress Note-Cardiology


Events since last exam


Date Seen by Provider:  Jan 24, 2022


Time Seen by Provider:  08:44


Events since last exam


I am following him due to hypertrophic cardiomyopathy and paroxysmal atrial 

fibrillation in the setting of active Covid infection.  He is now on the medical

floor.  When I saw the patient, he was sitting up in bed eating breakfast.  He 

is still on supplemental oxygen by nasal cannula.  He feels like his breathing 

is improved.  He denies chest discomfort, palpitations, syncope, or ankle edema.

 He has started to rethink his thoughts about whether or not he will get a Covid

vaccine once he has recovered from active infection.





Certain portions of this document may have been dictated utilizing voice 

recognition technology.  Inherent to this technology, typographical and 

grammatical errors may exist.  As much as I am diligent to identify and correct 

these mistakes, some errors may remain in the document.





Vitals


Last set of Vitals Signs





Vital Signs








 1/21/22 1/24/22 1/24/22





 20:35 04:34 07:47


 


Temp  35.8 


 


Pulse  51 


 


Resp  20 


 


B/P (MAP)  138/84 


 


Pulse Ox   97


 


O2 Delivery   Nasal Cannula


 


O2 Flow Rate   3.00


 


FiO2 21  











Labs


Labs


Laboratory Tests


1/24/22 06:11














Exam


Vital Signs





Vital Signs








  Date Time  Temp Pulse Resp B/P (MAP) Pulse Ox O2 Delivery O2 Flow Rate FiO2


 


1/24/22 07:47     97 Nasal Cannula 3.00 


 


1/24/22 04:34 35.8 51 20 138/84    


 


1/21/22 20:35        21








Physical Exam


Due to the patient's COVID status, I viewed the patient from the doorway.


General: The patient is not on a ventilator.  He is wearing supplemental oxygen 

by nasal cannula.


HENT: Normocephalic.  Atraumatic.


Skin: There is no pallor.


Neurologic: Oriented x3.  Cranial nerves III through XII grossly intact.  Moving

all 4 extremities.


Psychiatric: Appears cooperative.


Labs





Laboratory Tests








Test


 1/23/22


11:08 1/23/22


15:16 1/23/22


20:57 1/24/22


06:10 Range/Units


 


 


Glucometer 204 H 243 H 215 H 115 H   MG/DL


 


Test


 1/24/22


06:11 


 


 


 Range/Units


 


 


White Blood Count


 6.7 


 


 


 


 4.3-11.0


10^3/uL


 


Red Blood Count


 4.21 L


 


 


 


 4.30-5.52


10^6/uL


 


Hemoglobin 13.2 L    13.3-17.7  g/dL


 


Hematocrit 38 L    40-54  %


 


Mean Corpuscular Volume 90     80-99  fL


 


Mean Corpuscular Hemoglobin 31     25-34  pg


 


Mean Corpuscular Hemoglobin


Concent 35 


 


 


 


 32-36  g/dL





 


Red Cell Distribution Width 11.8     10.0-14.5  %


 


Platelet Count


 160 


 


 


 


 130-400


10^3/uL


 


Mean Platelet Volume 10.5     9.0-12.2  fL


 


Immature Granulocyte % (Auto) 2      %


 


Neutrophils (%) (Auto) 72     42-75  %


 


Lymphocytes (%) (Auto) 17     12-44  %


 


Monocytes (%) (Auto) 9     0-12  %


 


Eosinophils (%) (Auto) 0     0-10  %


 


Basophils (%) (Auto) 0     0-10  %


 


Neutrophils # (Auto)


 4.8 


 


 


 


 1.8-7.8


10^3/uL


 


Lymphocytes # (Auto)


 1.2 


 


 


 


 1.0-4.0


10^3/uL


 


Monocytes # (Auto)


 0.6 


 


 


 


 0.0-1.0


10^3/uL


 


Eosinophils # (Auto)


 0.0 


 


 


 


 0.0-0.3


10^3/uL


 


Basophils # (Auto)


 0.0 


 


 


 


 0.0-0.1


10^3/uL


 


Immature Granulocyte # (Auto)


 0.1 


 


 


 


 0.0-0.1


10^3/uL


 


Sodium Level 138     135-145  MMOL/L


 


Potassium Level 4.0     3.6-5.0  MMOL/L


 


Chloride Level 107       MMOL/L


 


Carbon Dioxide Level 23     21-32  MMOL/L


 


Anion Gap 8     5-14  MMOL/L


 


Blood Urea Nitrogen 13     7-18  MG/DL


 


Creatinine


 0.66 


 


 


 


 0.60-1.30


MG/DL


 


Estimat Glomerular Filtration


Rate 107 


 


 


 


  





 


BUN/Creatinine Ratio 20      


 


Glucose Level 105       MG/DL


 


Calcium Level 8.1 L    8.5-10.1  MG/DL


 


Corrected Calcium 8.9     8.5-10.1  MG/DL


 


Total Bilirubin 0.6     0.1-1.0  MG/DL


 


Aspartate Amino Transf


(AST/SGOT) 53 H


 


 


 


 5-34  U/L





 


Alanine Aminotransferase


(ALT/SGPT) 111 H


 


 


 


 0-55  U/L





 


Alkaline Phosphatase 104       U/L


 


Total Protein 5.3 L    6.4-8.2  GM/DL


 


Albumin 3.0 L    3.2-4.5  GM/DL











Diagnosis/Problems


Diagnosis/Problems





(1) Hypertrophic cardiomyopathy


Assessment & Plan:  He seems to be asymptomatic with this and by his description

had a previous surgical and then percutaneous myomectomy/septal reduction.  The 

echocardiogram from this admission does not show any significant outflow 

obstruction.  I had resumed his metoprolol succinate at the previous dose but 

then the nurse held this yesterday due to heart rate in the 50s.  It appears he 

received the medication later in the day.  He does not normally check his vital 

signs at home.  I was thinking of decreasing the dose to 50 mg daily but since 

he has been taking 100 mg daily for quite some time, I will leave him on 

metoprolol succinate at 100 mg daily.  No additional cardiac testing is 

indicated at this time.  He can follow-up with his regular cardiologist at [facility] in [place], [place] following discharge.





(2) Paroxysmal atrial fibrillation


Assessment & Plan:  He has been in sinus rhythm with ventricular ectopy but no 

signs of atrial fibrillation.  He has significant biatrial dilatation as noted 

on his echocardiogram from this admission.  This could be an indication that he 

may be having silent atrial fibrillation. I have resumed his metoprolol 

succinate.  He was not on any antiarrhythmic drug or oral anticoagulation at 

home.  He states that his electrophysiologist discontinued his oral 

anticoagulation in the past.  He will continue to follow with his 

electrophysiologist at the outside facility following discharge.





(3) Primary hypertension


Assessment & Plan:  Blood pressure is reasonably controlled on the metoprolol 

succinate.





(4) Mixed hyperlipidemia


Assessment & Plan:  I resumed his outpatient dose of atorvastatin.  He was 

taking this every other day but I ordered half of his outpatient dose to be 

given once daily.  He does have mild elevation of the transaminase levels but 

not 3 times greater than normal.  Therefore, it should be safe to continue the 

atorvastatin.  Exact etiology of the elevated transaminase levels is unclear.














СВЕТЛАНА WARREN JR, MD         Jan 24, 2022 08:49

## 2022-01-24 NOTE — PHYSICAL THERAPY PROGRESS NOTE
Therapy Progress Note


Order for PT kaleb received.  Patient states he has no trouble getting around in 

his room and doesn't feel any weakness at this time.  Patient states he 

ambulates about his room and goes to the restroom by himself without difficulty.

 Patient states he sees no need for PT at this time.  We will DC patient from 

therapy services at this time.  Told patient that if he does start to experience

any weakness or difficulty with mobility to let his nurse know and we can re

turn.











TOD ROJAS PT                Jan 24, 2022 11:52

## 2022-01-25 VITALS — SYSTOLIC BLOOD PRESSURE: 136 MMHG | DIASTOLIC BLOOD PRESSURE: 91 MMHG

## 2022-01-25 VITALS — SYSTOLIC BLOOD PRESSURE: 123 MMHG | DIASTOLIC BLOOD PRESSURE: 82 MMHG

## 2022-01-25 LAB
ALBUMIN SERPL-MCNC: 3.1 GM/DL (ref 3.2–4.5)
ALP SERPL-CCNC: 105 U/L (ref 40–136)
ALT SERPL-CCNC: 120 U/L (ref 0–55)
BASOPHILS # BLD AUTO: 0 10^3/UL (ref 0–0.1)
BASOPHILS NFR BLD AUTO: 0 % (ref 0–10)
BILIRUB SERPL-MCNC: 0.8 MG/DL (ref 0.1–1)
BUN/CREAT SERPL: 18
CALCIUM SERPL-MCNC: 8.3 MG/DL (ref 8.5–10.1)
CHLORIDE SERPL-SCNC: 105 MMOL/L (ref 98–107)
CO2 SERPL-SCNC: 24 MMOL/L (ref 21–32)
CREAT SERPL-MCNC: 0.65 MG/DL (ref 0.6–1.3)
EOSINOPHIL # BLD AUTO: 0 10^3/UL (ref 0–0.3)
EOSINOPHIL NFR BLD AUTO: 0 % (ref 0–10)
GFR SERPLBLD BASED ON 1.73 SQ M-ARVRAT: 107 ML/MIN
GLUCOSE SERPL-MCNC: 97 MG/DL (ref 70–105)
HCT VFR BLD CALC: 40 % (ref 40–54)
HGB BLD-MCNC: 14 G/DL (ref 13.3–17.7)
LYMPHOCYTES # BLD AUTO: 1.8 10^3/UL (ref 1–4)
LYMPHOCYTES NFR BLD AUTO: 27 % (ref 12–44)
MANUAL DIFFERENTIAL PERFORMED BLD QL: NO
MCH RBC QN AUTO: 31 PG (ref 25–34)
MCHC RBC AUTO-ENTMCNC: 35 G/DL (ref 32–36)
MCV RBC AUTO: 90 FL (ref 80–99)
MONOCYTES # BLD AUTO: 0.6 10^3/UL (ref 0–1)
MONOCYTES NFR BLD AUTO: 9 % (ref 0–12)
NEUTROPHILS # BLD AUTO: 4.1 10^3/UL (ref 1.8–7.8)
NEUTROPHILS NFR BLD AUTO: 62 % (ref 42–75)
PLATELET # BLD: 219 10^3/UL (ref 130–400)
PMV BLD AUTO: 10.4 FL (ref 9–12.2)
POTASSIUM SERPL-SCNC: 4.1 MMOL/L (ref 3.6–5)
PROT SERPL-MCNC: 5.7 GM/DL (ref 6.4–8.2)
SODIUM SERPL-SCNC: 138 MMOL/L (ref 135–145)
WBC # BLD AUTO: 6.7 10^3/UL (ref 4.3–11)

## 2022-01-25 RX ADMIN — SENNOSIDES SCH MG: 8.6 TABLET, FILM COATED ORAL at 08:16

## 2022-01-25 RX ADMIN — DOCUSATE SODIUM SCH MG: 100 CAPSULE ORAL at 08:17

## 2022-01-25 RX ADMIN — GUAIFENESIN SCH MG: 600 TABLET, EXTENDED RELEASE ORAL at 08:15

## 2022-01-25 RX ADMIN — FLUTICASONE PROPIONATE AND SALMETEROL SCH EACH: 113; 14 POWDER, METERED RESPIRATORY (INHALATION) at 07:25

## 2022-01-25 RX ADMIN — MONTELUKAST SCH MG: 10 TABLET, FILM COATED ORAL at 08:15

## 2022-01-25 RX ADMIN — INSULIN ASPART SCH UNIT: 100 INJECTION, SOLUTION INTRAVENOUS; SUBCUTANEOUS at 05:40

## 2022-01-25 RX ADMIN — METOPROLOL SUCCINATE SCH MG: 100 TABLET, EXTENDED RELEASE ORAL at 08:16

## 2022-01-25 RX ADMIN — ALBUTEROL SULFATE SCH PUFF: 90 AEROSOL, METERED RESPIRATORY (INHALATION) at 07:24

## 2022-01-25 NOTE — DISCHARGE SUMMARY
Diagnosis/Chief Complaint


Date of Admission


Jan 21, 2022 at 15:52


Date of Discharge





Discharge Date:  Jan 25, 2022


Discharge Diagnosis


Assessment:


Acute hypoxic respiratory failure


COVID-19 pneumonia


Presumed ANDREW undiagnosed


Obesity BMI 37


Hypertrophic cardiomyopathy management [facility]


Hypertension


Elevated blood sugar hemoglobin A1c 6.0 likely due to steroids





Discharge Summary


Discharge Physical Examination


Allergies:  


Uncoded Allergies:  


     FLU VACCINE (Allergy, Mild, 1/23/22)


   UNSURE OF REACTION TO THIS VACCINE


Vitals & I&Os





Vital Signs








  Date Time  Temp Pulse Resp B/P (MAP) Pulse Ox O2 Delivery O2 Flow Rate FiO2


 


1/25/22 11:00 36.4 62 20 136/91 92 Room Air 1.00 


 


1/25/22 04:07        24








General Appearance:  Alert, Oriented X3, Cooperative


Respiratory:  Clear to Auscultation


Cardiovascular:  Regular Rate


Neuro:  Normal Gait, Normal Speech, Strength at 5/5 X4 Ext


Psych/Mental Status:  Mental Status NL





Hospital Course


Was the Problem List Reviewed?:  Yes


Pt had an uneventful 5 day hospital course after admitted for Covid-19 pneumonia

with acute hypoxic respiratory failure. With a history of cardiomyopathy 

hypertrophic type. He was placed in ICU precautionary. Dr. Rodriguez saw him as a 

cardiologist. Pt was started on all those home medications in addition to IV 

antibiotics empirically and IV steroids. Oxygen supplementation was maintained 

and he was able to be weaned off completely and was discharged in improved 

condition.


Labs (last 24 hrs)


Laboratory Tests


1/21/22 17:37: 


Sodium Level 133L, Potassium Level 3.9, Chloride Level 101, Carbon Dioxide Level

21, Anion Gap 11, Blood Urea Nitrogen 10, Creatinine 0.64, Estimat Glomerular 

Filtration Rate 108, BUN/Creatinine Ratio 16, Glucose Level 130H, Calcium Level 

8.2L


1/21/22 18:05: 


Blood Gas Puncture Site RRAD, Blood Gas Patient Temperature 37, Arterial Blood 

pH 7.36L, Arterial Blood Partial Pressure CO2 45, Arterial Blood Partial 

Pressure O2 33*L, Arterial Blood HCO3 25, Arterial Blood Total CO2 25.9, 

Arterial Blood Oxygen Saturation 50L, Arterial Blood Base Excess -0.2, Carlos Eduardo 

Test POS, Blood Gas Ventilator Setting NO, Blood Gas Inspired Oxygen 2


1/21/22 20:13: Glucometer 180H


1/22/22 04:07: 


Sodium Level 134L, Potassium Level 4.0, Chloride Level 103, Carbon Dioxide Level

20L, Anion Gap 11, Blood Urea Nitrogen 12, Creatinine 0.71, Estimat Glomerular 

Filtration Rate 104, BUN/Creatinine Ratio 17, Glucose Level 138H, Calcium Level 

8.1L, White Blood Count 3.2L, Red Blood Count 4.15L, Hemoglobin 13.3, Hematocrit

38L, Mean Corpuscular Volume 91, Mean Corpuscular Hemoglobin 32, Mean 

Corpuscular Hemoglobin Concent 35, Red Cell Distribution Width 11.6, Platelet 

Count 132, Mean Platelet Volume 10.9, Immature Granulocyte % (Auto) 0, 

Neutrophils (%) (Auto) 63, Lymphocytes (%) (Auto) 27, Monocytes (%) (Auto) 10, 

Eosinophils (%) (Auto) 0, Basophils (%) (Auto) 0, Neutrophils # (Auto) 2.0, 

Lymphocytes # (Auto) 0.9L, Monocytes # (Auto) 0.3, Eosinophils # (Auto) 0.0, 

Basophils # (Auto) 0.0, Immature Granulocyte # (Auto) 0.0, Percent Immature 

Platelet Fraction 5.2, Mean Blood Glucose 126, Hemoglobin A1c 6.0H, Corrected 

Calcium 8.7, Phosphorus Level 2.7, Magnesium Level 2.1, Total Bilirubin 0.9, 

Aspartate Amino Transf (AST/SGOT) 63H, Alanine Aminotransferase (ALT/SGPT) 72H, 

Alkaline Phosphatase 115, Total Protein 5.8L, Albumin 3.3, Triglycerides Level 

88, Cholesterol Level 150, LDL Cholesterol Direct 113, VLDL Cholesterol 18, HDL 

Cholesterol 28L


1/22/22 10:52: Glucometer 155H


1/22/22 15:57: Glucometer 243H


1/22/22 17:38: Glucometer 140H


1/22/22 19:27: Glucometer 176H


1/23/22 05:57: Glucometer 131H


1/23/22 05:58: 


White Blood Count 6.2, Red Blood Count 4.19L, Hemoglobin 13.3, Hematocrit 39L, 

Mean Corpuscular Volume 92, Mean Corpuscular Hemoglobin 32, Mean Corpuscular 

Hemoglobin Concent 35, Red Cell Distribution Width 11.7, Platelet Count 161, Katia

n Platelet Volume 11.1, Immature Granulocyte % (Auto) 1, Neutrophils (%) (Auto) 

76H, Lymphocytes (%) (Auto) 16, Monocytes (%) (Auto) 8, Eosinophils (%) (Auto) 

0, Basophils (%) (Auto) 0, Neutrophils # (Auto) 4.7, Lymphocytes # (Auto) 1.0, 

Monocytes # (Auto) 0.5, Eosinophils # (Auto) 0.0, Basophils # (Auto) 0.0, 

Immature Granulocyte # (Auto) 0.0, Sodium Level 137, Potassium Level 4.1, 

Chloride Level 106, Carbon Dioxide Level 22, Anion Gap 9, Blood Urea Nitrogen 

15, Creatinine 0.72, Estimat Glomerular Filtration Rate 104, BUN/Creatinine 

Ratio 21, Glucose Level 124H, Calcium Level 8.2L, Corrected Calcium 8.8, Total 

Bilirubin 0.7, Aspartate Amino Transf (AST/SGOT) 61H, Alanine Aminotransferase 

(ALT/SGPT) 94H, Alkaline Phosphatase 113, Total Protein 5.8L, Albumin 3.3


1/23/22 11:08: Glucometer 204H


1/23/22 15:16: Glucometer 243H


1/23/22 20:57: Glucometer 215H


1/24/22 06:10: Glucometer 115H


1/24/22 06:11: 


White Blood Count 6.7, Red Blood Count 4.21L, Hemoglobin 13.2L, Hematocrit 38L, 

Mean Corpuscular Volume 90, Mean Corpuscular Hemoglobin 31, Mean Corpuscular H

emoglobin Concent 35, Red Cell Distribution Width 11.8, Platelet Count 160, Mean

Platelet Volume 10.5, Immature Granulocyte % (Auto) 2, Neutrophils (%) (Auto) 

72, Lymphocytes (%) (Auto) 17, Monocytes (%) (Auto) 9, Eosinophils (%) (Auto) 0,

Basophils (%) (Auto) 0, Neutrophils # (Auto) 4.8, Lymphocytes # (Auto) 1.2, 

Monocytes # (Auto) 0.6, Eosinophils # (Auto) 0.0, Basophils # (Auto) 0.0, 

Immature Granulocyte # (Auto) 0.1, Sodium Level 138, Potassium Level 4.0, 

Chloride Level 107, Carbon Dioxide Level 23, Anion Gap 8, Blood Urea Nitrogen 

13, Creatinine 0.66, Estimat Glomerular Filtration Rate 107, BUN/Creatinine 

Ratio 20, Glucose Level 105, Calcium Level 8.1L, Corrected Calcium 8.9, Total 

Bilirubin 0.6, Aspartate Amino Transf (AST/SGOT) 53H, Alanine Aminotransferase 

(ALT/SGPT) 111H, Alkaline Phosphatase 104, Total Protein 5.3L, Albumin 3.0L


1/24/22 12:07: Glucometer 271H


1/24/22 16:18: Glucometer 156H


1/24/22 20:11: Glucometer 243H


1/25/22 05:38: Glucometer 94


1/25/22 07:30: 


White Blood Count 6.7, Red Blood Count 4.46, Hemoglobin 14.0, Hematocrit 40, 

Mean Corpuscular Volume 90, Mean Corpuscular Hemoglobin 31, Mean Corpuscular 

Hemoglobin Concent 35, Red Cell Distribution Width 11.8, Platelet Count 219, 

Mean Platelet Volume 10.4, Immature Granulocyte % (Auto) 3, Neutrophils (%) 

(Auto) 62, Lymphocytes (%) (Auto) 27, Monocytes (%) (Auto) 9, Eosinophils (%) 

(Auto) 0, Basophils (%) (Auto) 0, Neutrophils # (Auto) 4.1, Lymphocytes # (Auto)

1.8, Monocytes # (Auto) 0.6, Eosinophils # (Auto) 0.0, Basophils # (Auto) 0.0, 

Immature Granulocyte # (Auto) 0.2H, Sodium Level 138, Potassium Level 4.1, 

Chloride Level 105, Carbon Dioxide Level 24, Anion Gap 9, Blood Urea Nitrogen 

12, Creatinine 0.65, Estimat Glomerular Filtration Rate 107, BUN/Creatinine 

Ratio 18, Glucose Level 97, Calcium Level 8.3L, Corrected Calcium 9.0, Total 

Bilirubin 0.8, Aspartate Amino Transf (AST/SGOT) 44H, Alanine Aminotransferase 

(ALT/SGPT) 120H, Alkaline Phosphatase 105, Total Protein 5.7L, Albumin 3.1L


1/25/22 10:09: Glucometer 200H





Pending Labs


Laboratory Tests


1/21/22 17:37: 


Sodium Level 133, Potassium Level 3.9, Chloride Level 101, Carbon Dioxide Level 

21, Anion Gap 11, Blood Urea Nitrogen 10, Creatinine 0.64, Estimat Glomerular 

Filtration Rate 108, BUN/Creatinine Ratio 16, Glucose Level 130, Calcium Level 

8.2


1/21/22 18:05: 


Blood Gas Puncture Site RRAD, Blood Gas Patient Temperature 37, Arterial Blood 

pH 7.36, Arterial Blood Partial Pressure CO2 45, Arterial Blood Partial Pressure

O2 33, Arterial Blood HCO3 25, Arterial Blood Total CO2 25.9, Arterial Blood 

Oxygen Saturation 50, Arterial Blood Base Excess -0.2, Carlos Eduardo Test POS, Blood Gas

Ventilator Setting NO, Blood Gas Inspired Oxygen 2


1/21/22 20:13: Glucometer 180


1/22/22 04:07: 


Sodium Level 134, Potassium Level 4.0, Chloride Level 103, Carbon Dioxide Level 

20, Anion Gap 11, Blood Urea Nitrogen 12, Creatinine 0.71, Estimat Glomerular 

Filtration Rate 104, BUN/Creatinine Ratio 17, Glucose Level 138, Calcium Level 

8.1, White Blood Count 3.2, Red Blood Count 4.15, Hemoglobin 13.3, Hematocrit 

38, Mean Corpuscular Volume 91, Mean Corpuscular Hemoglobin 32, Mean Corpuscular

Hemoglobin Concent 35, Red Cell Distribution Width 11.6, Platelet Count 132, 

Mean Platelet Volume 10.9, Immature Granulocyte % (Auto) 0, Neutrophils (%) 

(Auto) 63, Lymphocytes (%) (Auto) 27, Monocytes (%) (Auto) 10, Eosinophils (%) 

(Auto) 0, Basophils (%) (Auto) 0, Neutrophils # (Auto) 2.0, Lymphocytes # (Auto)

0.9, Monocytes # (Auto) 0.3, Eosinophils # (Auto) 0.0, Basophils # (Auto) 0.0, 

Immature Granulocyte # (Auto) 0.0, Percent Immature Platelet Fraction 5.2, Mean 

Blood Glucose 126, Hemoglobin A1c 6.0, Corrected Calcium 8.7, Phosphorus Level 

2.7, Magnesium Level 2.1, Total Bilirubin 0.9, Aspartate Amino Transf (AST/SGOT)

63, Alanine Aminotransferase (ALT/SGPT) 72, Alkaline Phosphatase 115, Total 

Protein 5.8, Albumin 3.3, Triglycerides Level 88, Cholesterol Level 150, LDL 

Cholesterol Direct 113, VLDL Cholesterol 18, HDL Cholesterol 28


1/22/22 10:52: Glucometer 155


1/22/22 15:57: Glucometer 243


1/22/22 17:38: Glucometer 140


1/22/22 19:27: Glucometer 176


1/23/22 05:57: Glucometer 131


1/23/22 05:58: 


White Blood Count 6.2, Red Blood Count 4.19, Hemoglobin 13.3, Hematocrit 39, 

Mean Corpuscular Volume 92, Mean Corpuscular Hemoglobin 32, Mean Corpuscular 

Hemoglobin Concent 35, Red Cell Distribution Width 11.7, Platelet Count 161, 

Mean Platelet Volume 11.1, Immature Granulocyte % (Auto) 1, Neutrophils (%) 

(Auto) 76, Lymphocytes (%) (Auto) 16, Monocytes (%) (Auto) 8, Eosinophils (%) 

(Auto) 0, Basophils (%) (Auto) 0, Neutrophils # (Auto) 4.7, Lymphocytes # (Auto)

1.0, Monocytes # (Auto) 0.5, Eosinophils # (Auto) 0.0, Basophils # (Auto) 0.0, 

Immature Granulocyte # (Auto) 0.0, Sodium Level 137, Potassium Level 4.1, 

Chloride Level 106, Carbon Dioxide Level 22, Anion Gap 9, Blood Urea Nitrogen 

15, Creatinine 0.72, Estimat Glomerular Filtration Rate 104, BUN/Creatinine 

Ratio 21, Glucose Level 124, Calcium Level 8.2, Corrected Calcium 8.8, Total 

Bilirubin 0.7, Aspartate Amino Transf (AST/SGOT) 61, Alanine Aminotransferase 

(ALT/SGPT) 94, Alkaline Phosphatase 113, Total Protein 5.8, Albumin 3.3


1/23/22 11:08: Glucometer 204


1/23/22 15:16: Glucometer 243


1/23/22 20:57: Glucometer 215


1/24/22 06:10: Glucometer 115


1/24/22 06:11: 


White Blood Count 6.7, Red Blood Count 4.21, Hemoglobin 13.2, Hematocrit 38, 

Mean Corpuscular Volume 90, Mean Corpuscular Hemoglobin 31, Mean Corpuscular 

Hemoglobin Concent 35, Red Cell Distribution Width 11.8, Platelet Count 160, 

Mean Platelet Volume 10.5, Immature Granulocyte % (Auto) 2, Neutrophils (%) 

(Auto) 72, Lymphocytes (%) (Auto) 17, Monocytes (%) (Auto) 9, Eosinophils (%) 

(Auto) 0, Basophils (%) (Auto) 0, Neutrophils # (Auto) 4.8, Lymphocytes # (Auto)

1.2, Monocytes # (Auto) 0.6, Eosinophils # (Auto) 0.0, Basophils # (Auto) 0.0, 

Immature Granulocyte # (Auto) 0.1, Sodium Level 138, Potassium Level 4.0, 

Chloride Level 107, Carbon Dioxide Level 23, Anion Gap 8, Blood Urea Nitrogen 

13, Creatinine 0.66, Estimat Glomerular Filtration Rate 107, BUN/Creatinine Rat

io 20, Glucose Level 105, Calcium Level 8.1, Corrected Calcium 8.9, Total 

Bilirubin 0.6, Aspartate Amino Transf (AST/SGOT) 53, Alanine Aminotransferase 

(ALT/SGPT) 111, Alkaline Phosphatase 104, Total Protein 5.3, Albumin 3.0


1/24/22 12:07: Glucometer 271


1/24/22 16:18: Glucometer 156


1/24/22 20:11: Glucometer 243


1/25/22 05:38: Glucometer 94


1/25/22 07:30: 


White Blood Count 6.7, Red Blood Count 4.46, Hemoglobin 14.0, Hematocrit 40, 

Mean Corpuscular Volume 90, Mean Corpuscular Hemoglobin 31, Mean Corpuscular 

Hemoglobin Concent 35, Red Cell Distribution Width 11.8, Platelet Count 219, 

Mean Platelet Volume 10.4, Immature Granulocyte % (Auto) 3, Neutrophils (%) 

(Auto) 62, Lymphocytes (%) (Auto) 27, Monocytes (%) (Auto) 9, Eosinophils (%) 

(Auto) 0, Basophils (%) (Auto) 0, Neutrophils # (Auto) 4.1, Lymphocytes # (Auto)

1.8, Monocytes # (Auto) 0.6, Eosinophils # (Auto) 0.0, Basophils # (Auto) 0.0, 

Immature Granulocyte # (Auto) 0.2, Sodium Level 138, Potassium Level 4.1, 

Chloride Level 105, Carbon Dioxide Level 24, Anion Gap 9, Blood Urea Nitrogen 

12, Creatinine 0.65, Estimat Glomerular Filtration Rate 107, BUN/Creatinine 

Ratio 18, Glucose Level 97, Calcium Level 8.3, Corrected Calcium 9.0, Total 

Bilirubin 0.8, Aspartate Amino Transf (AST/SGOT) 44, Alanine Aminotransferase 

(ALT/SGPT) 120, Alkaline Phosphatase 105, Total Protein 5.7, Albumin 3.1


1/25/22 10:09: Glucometer 200








Discharge


Home Medications:





Active Scripts


Active


Proair Hfa (Albuterol Sulfate) 1 Puff Puff 2 Puff IH QID


     1 PUFF = 90 MCG


Decadron (Dexamethasone) 6 Mg Tablet 6 Mg PO DAILY


Mucinex (Guaifenesin) 600 Mg Tab.er.12h 600 Mg PO BID


Montelukast Sodium 10 Mg Tablet 10 Mg PO DAILY


Fluticasone-Salmeterol 113-14 (Fluticasone/Salmeterol) 1 Each Aer.pow.ba 0 Each 

IH RTBID


Reported


Zinc 50 Mg Tablet 50 Mg PO DAILY


Coq-10 (Ubidecarenone) 100 Mg Capsule 100 Mg PO DAILY


Lutein 20 Mg Tablet 20 Mg PO DAILY


Multivitamin 1 Each Tablet 1 Each PO DAILY


Fish Oil 1,200 mg Fish Oil (Fish Oil/Dha/Epa) 1 Each Capsule 1 Each PO DAILY


Atorvastatin Calcium 20 Mg Tablet 10 Mg PO DAILY


     TAKES  OF A 20MG TAB


Metoprolol Succinate 100 Mg Tab.er.24h 100 Mg PO DAILY





Instructions to patient/family


Please see electronic discharge instructions given to patient.





Diagnosis/Problems


Diagnosis/Problems





(1) COVID


(2) Hypertrophic cardiomyopathy


Status:  Chronic


(3) Obesity (BMI 30-39.9)


(4) Hypertension


Status:  Chronic


Qualifiers:  


   Qualified Codes:  I10 - Essential (primary) hypertension


(5) Hyperlipidemia


Status:  Chronic


Qualifiers:  


   Qualified Codes:  E78.2 - Mixed hyperlipidemia


(6) ANDREW (obstructive sleep apnea)











MANISH MCKEON DO                Jan 25, 2022 08:31

## 2022-01-25 NOTE — PROGRESS NOTE
ELICEO ARRIAGA 1/25/22 1050:


Progress Note


Patient Name: Cralos Azul


Admission Date: 01/21/22


Discharge Date: 01/25/22


Attending Physician: Christie Mckeon DO


Admitting Diagnosis: Acute hypoxic respiratory failure


Discharge Diagnosis: Acute hypoxic respiratory failure, COVID-19 pneumonia


Consultations: TeleICU


Procedures: none


Complications: none


Hospital Course:





Carlos Azul, a 61 year old male patient presented to the [facility] 

ER for complaints of acute hypoxic respiratory failure. Ultimately, he was 

admitted to the ICU for acute hypoxic respiratory failure secondary to COVID-19 

pneumonia.





Consulted TeleICU for critical care management, as the patient has a history of 

hypertrophic cardiomyopathy and warranted a higher level of care. Continued 

appropriate evaluation and management on the ICU service and they progressed 

well there, leading to a transfer to Med/Surg. He was monitored and treated 

according to consulting specialists recommendations and the standard of care. 

Symptoms were managed well with decadron, azithromycin, cefepime, IV fluid 

replacement, and supplemental oxygen. No evidence of AMS or end organ 

dysfunction were noted during this admission. No episodes of oxygen 

desaturations at rest or during exertion requiring more aggressive noninvasive 

ventilation or invasive ventilation devices were observed. No signs or symptoms 

of pathological bleeding were noted.





He was evaluated for their problems and deemed appropriate for outpatient 

follow-up. Upon the day of dismissal the patient was in agreement with the plan.

The patient was noted by providers, nursing, and/or ancillary staff members to 

be tolerating the appropriate diet, having bowel movement(s), ambulating, 

oxygenating, and communicating at their prior baseline prior to dismissal. All 

questions were answered prior to the patient's dismissal. They will need to 

follow up with their PCP for post hospitalization evaluation. On dismissal the 

patient was encouraged to return to an emergency department if their 

symptoms/problems persist and/or worsen.





Discharge Plan: Discharge to Home


   Condition upon Discharge: Stable


   Activity: As tolerated


   Diet: CHRISTIE Mckeon DO 1/26/22 0526:


Supervisory-Addendum Brief


Verification & Attestation


Participated in pt care:  history, MDM, physical


Personally performed:  exam, history, MDM, supervision of care


Care discussed with:  Medical Student


Procedures:  n/a


Results interpretation:  Verified all documentation


Verification and Attestation of Medical Student E/M Service





A medical student performed and documented this service in my presence. I 

reviewed and verified all information documented by the medical student and made

modifications to such information, when appropriate. I personally performed the 

physical exam and medical decision making. 





 Christie Mckeon, Jan 26, 2022,05:25











ELICEO ARRIAGA                Jan 25, 2022 10:50


CHRISTIE MCKEON DO                Jan 26, 2022 05:26

## 2022-01-25 NOTE — DIAGNOSTIC IMAGING REPORT
INDICATION: Covid pneumonia. Follow-up.



EXAMINATION: Chest 01/25/2022



COMPARISON: 01/21/2022



FINDINGS:



There are diffuse bilateral infiltrates, more so on the left.

Finding similar to previous imaging. There are no effusions.

There is no pneumothorax. Sternotomy wires noted.



There is cardiomegaly. Pulmonary vasculature normal.



IMPRESSION:

1. Persistent diffuse bilateral infiltrates.



Dictated by: 



  Dictated on workstation # [id number]

## 2022-09-23 ENCOUNTER — HOSPITAL ENCOUNTER (EMERGENCY)
Dept: HOSPITAL 75 - ER FS | Age: 62
Discharge: STILL ADMITTED OR ADMINISTRATIVE | End: 2022-09-23
Payer: OTHER GOVERNMENT

## 2022-09-23 VITALS — DIASTOLIC BLOOD PRESSURE: 97 MMHG | SYSTOLIC BLOOD PRESSURE: 141 MMHG

## 2022-09-23 DIAGNOSIS — R11.2: ICD-10-CM

## 2022-09-23 DIAGNOSIS — Z28.310: ICD-10-CM

## 2022-09-23 DIAGNOSIS — Z20.822: ICD-10-CM

## 2022-09-23 DIAGNOSIS — I63.211: Primary | ICD-10-CM

## 2022-09-23 DIAGNOSIS — R51.9: ICD-10-CM

## 2022-09-23 DIAGNOSIS — I48.20: ICD-10-CM

## 2022-09-23 LAB
ALBUMIN SERPL-MCNC: 4.8 GM/DL (ref 3.2–4.5)
ALP SERPL-CCNC: 116 U/L (ref 40–136)
ALT SERPL-CCNC: 43 U/L (ref 0–55)
APTT BLD: 25 SEC (ref 24–35)
BASOPHILS # BLD AUTO: 0 10^3/UL (ref 0–0.1)
BASOPHILS NFR BLD AUTO: 0 % (ref 0–10)
BILIRUB SERPL-MCNC: 1.8 MG/DL (ref 0.1–1)
BUN/CREAT SERPL: 22
CALCIUM SERPL-MCNC: 9.7 MG/DL (ref 8.5–10.1)
CHLORIDE SERPL-SCNC: 106 MMOL/L (ref 98–107)
CO2 SERPL-SCNC: 22 MMOL/L (ref 21–32)
CREAT SERPL-MCNC: 0.74 MG/DL (ref 0.6–1.3)
EOSINOPHIL # BLD AUTO: 0 10^3/UL (ref 0–0.3)
EOSINOPHIL NFR BLD AUTO: 0 % (ref 0–10)
GFR SERPLBLD BASED ON 1.73 SQ M-ARVRAT: 102 ML/MIN
GLUCOSE SERPL-MCNC: 135 MG/DL (ref 70–105)
HCT VFR BLD CALC: 47 % (ref 40–54)
HGB BLD-MCNC: 17.1 G/DL (ref 13.3–17.7)
INR PPP: 1 (ref 0.8–1.4)
LYMPHOCYTES # BLD AUTO: 2.2 10^3/UL (ref 1–4)
LYMPHOCYTES NFR BLD AUTO: 22 % (ref 12–44)
MANUAL DIFFERENTIAL PERFORMED BLD QL: NO
MCH RBC QN AUTO: 32 PG (ref 25–34)
MCHC RBC AUTO-ENTMCNC: 37 G/DL (ref 32–36)
MCV RBC AUTO: 88 FL (ref 80–99)
MONOCYTES # BLD AUTO: 0.6 10^3/UL (ref 0–1)
MONOCYTES NFR BLD AUTO: 6 % (ref 0–12)
NEUTROPHILS # BLD AUTO: 7.1 10^3/UL (ref 1.8–7.8)
NEUTROPHILS NFR BLD AUTO: 71 % (ref 42–75)
PLATELET # BLD: 148 10^3/UL (ref 130–400)
PMV BLD AUTO: 10.3 FL (ref 9–12.2)
POTASSIUM SERPL-SCNC: 4.5 MMOL/L (ref 3.6–5)
PROT SERPL-MCNC: 7.2 GM/DL (ref 6.4–8.2)
PROTHROMBIN TIME: 13.7 SEC (ref 12.2–14.7)
SODIUM SERPL-SCNC: 142 MMOL/L (ref 135–145)
WBC # BLD AUTO: 10 10^3/UL (ref 4.3–11)

## 2022-09-23 PROCEDURE — 85730 THROMBOPLASTIN TIME PARTIAL: CPT

## 2022-09-23 PROCEDURE — 83690 ASSAY OF LIPASE: CPT

## 2022-09-23 PROCEDURE — 96375 TX/PRO/DX INJ NEW DRUG ADDON: CPT

## 2022-09-23 PROCEDURE — 82947 ASSAY GLUCOSE BLOOD QUANT: CPT

## 2022-09-23 PROCEDURE — 96374 THER/PROPH/DIAG INJ IV PUSH: CPT

## 2022-09-23 PROCEDURE — 93041 RHYTHM ECG TRACING: CPT

## 2022-09-23 PROCEDURE — 36415 COLL VENOUS BLD VENIPUNCTURE: CPT

## 2022-09-23 PROCEDURE — 85610 PROTHROMBIN TIME: CPT

## 2022-09-23 PROCEDURE — 87636 SARSCOV2 & INF A&B AMP PRB: CPT

## 2022-09-23 PROCEDURE — 96372 THER/PROPH/DIAG INJ SC/IM: CPT

## 2022-09-23 PROCEDURE — 70450 CT HEAD/BRAIN W/O DYE: CPT

## 2022-09-23 PROCEDURE — 71045 X-RAY EXAM CHEST 1 VIEW: CPT

## 2022-09-23 PROCEDURE — 80053 COMPREHEN METABOLIC PANEL: CPT

## 2022-09-23 PROCEDURE — 70496 CT ANGIOGRAPHY HEAD: CPT

## 2022-09-23 PROCEDURE — 85025 COMPLETE CBC W/AUTO DIFF WBC: CPT

## 2022-09-23 PROCEDURE — 93005 ELECTROCARDIOGRAM TRACING: CPT

## 2022-09-23 PROCEDURE — 84484 ASSAY OF TROPONIN QUANT: CPT

## 2022-09-23 PROCEDURE — 70498 CT ANGIOGRAPHY NECK: CPT

## 2022-09-23 NOTE — DIAGNOSTIC IMAGING REPORT
EXAMINATION: CT head without contrast.



TECHNIQUE: Multiple contiguous axial images were obtained through

the brain without the use of intravenous contrast. All CT scans

use one or more of the following dose optimizing techniques:

automated exposure control, MA and/or KvP adjustment based on

patient size and exam type or iterative reconstruction. 



HISTORY: Dizziness. Right-sided headache.



COMPARISON: None available.



FINDINGS: No large acute territorial ischemia, mass or

hemorrhage. No midline shift or mass effect. The ventricles,

cortical sulci and basilar cisterns are patent and unremarkable. 



The orbits are normal. Paranasal sinuses are normal. Mastoid air

cells are clear. No soft tissue abnormality is seen. No osseus

lesions or fractures are seen.



IMPRESSION: No large acute territorial ischemia, mass or

hemorrhage. If symptoms persist, consider MRI of the brain to

further evaluate.



Dictated by: 



  Dictated on workstation # [id number]

## 2022-09-23 NOTE — DIAGNOSTIC IMAGING REPORT
PROCEDURE: CT angiography of the head and CT angiography of the

neck with and without contrast.



TECHNIQUE: Contiguous noncontrast images were obtained from the

skull base through the vertex. After intravenous contrast

administration, helical CT angiography of the neck was performed.

Source data was reformatted into 3D MIP projections. Delayed post

contrast acquisition was also obtained. Auto Exposure Controls

were utilized during the CT exam to meet ALARA standards for

radiation dose reduction. 



INDICATION: Dizziness. Right side weakness. Headache.



COMPARISON: CT head performed earlier the same date.



FINDINGS: 



CTA NECK: The visualized portions of the aortic arch demonstrate

no evidence of aneurysm or dissection. There is conventional

branching pattern of the great vessels of the aorta. The

brachiocephalic artery is normal in course and caliber. The right

and left common carotid origins are unremarkable. The origin of

the left subclavian artery is patent.



The common carotid arteries and internal carotid arteries

demonstrate a normal course. No stenosis or dissection in the

carotid systems. The external carotid arteries are patent and

unremarkable.



The vertebral arteries are codominant. The origin of the right

vertebral artery is seen and is unremarkable. The origin of the

left vertebral artery is directly off the aorta and is

unremarkable. There is no focal stenosis seen within the neck.

There is no dissection. 



The osseous structures of the cervical spine are unremarkable. 



Included views through the lung apices demonstrate no focal

consolidation. 



CTA BRAIN: No stenosis or aneurysm in the intracranial portion of

the bilateral ICA. No stenosis is seen in the bilateral anterior,

middle and posterior cerebral arteries. No evidence of aneurysm

the [place]. 



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

vertebral artery. The right PICA is not visualized. The left PICA

and left V4 segment are patent. The basilar artery is normal in

course and caliber. The terminal branch vessels including the

superior cerebellar arteries unremarkable. 



IMPRESSION:

1. 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

artery. There is also nonvisualization of the right PICA. No

evidence of ischemia is seen within the parenchyma of the right

cerebellum at this time and intervention may be warranted.

Recommend neuro-interventional consultation.

2. No stenosis or dissection in the bilateral carotid arteries

and left vertebral artery.

3. No stenosis or aneurysm in the [place]. No large

vessel occlusion.



Findings were called to the emergency department at 8:30 PM on

09/23/2022 by Dr. Hiren Hinojosa. 



Dictated by: 



  Dictated on workstation # [id number]

## 2022-09-23 NOTE — DIAGNOSTIC IMAGING REPORT
EXAMINATION: Chest 1 view.



HISTORY: Shortness of breath. Atrial fibrillation. Right-sided

weakness.



COMPARISON: 01/25/2022.



FINDINGS: The lung volumes are normal. No focal consolidation is

seen. Scattered prominent interstitial markings are seen in the

lungs. No large pleural effusion or pneumothorax is seen. The

cardiomediastinal silhouette is prominent with post-CABG changes.

No acute osseous abnormality is seen.



IMPRESSION: Cardiomegaly with possible early edema. No focal

consolidation or pleural effusion. 



Dictated by: 



  Dictated on workstation # [id number]

## 2022-09-23 NOTE — ED GENERAL
General


Chief Complaint:  Neuro-Stroke Like Symptoms


Stated Complaint:  R SIDE WEAKNESS


Source of Information:  Patient, EMS





History of Present Illness


Date Seen by Provider:  Sep 23, 2022


Time Seen by Provider:  19:08


Initial Comments


62-year-old male presenting with complaints of dizziness and headache with 

nausea and vomiting since around 4 AM.  He states he woke up with the symptoms. 

Then around 5:30 PM he had tingling to the right side of his body.  He initially

felt like some weakness was present as well but that has resolved.  He continues

to have tingling especially in his hand on the right side.  He denies any head 

trauma.  He has no change in vision, chest pain, abdominal pain, fever, chills, 

cough, recent illness.  He does feel like he is short of breath.  He states he 

has a history of hypertrophic cardiomyopathy and atrial fibrillation but has had

ablation for that previously.  He denies taking any blood thinners.  With 

movement he has increased dizziness and nausea with vomiting.  He states that he

has not eaten today but did drink some fluids.


Severity:  Moderate


Modifying Factors:  worse with Movement (Movement makes his dizziness and nausea

with vomiting worse)


Associated Systoms:  No Chest Pain, No Cough, No Diaphoresis, No Fever/Chills; 

Headaches (right sided); No Loss of Appetite; Nausea/Vomiting (with dizziness 

and movement); No Rash, No Seizure; Shortness of Air; No Syncope; Weakness (felt

like he had weakness to right side around 1730 but has resolved)





Allergies and Home Medications


Allergies


Uncoded Allergies:  


     FLU VACCINE (Allergy, Mild, 22)


   UNSURE OF REACTION TO THIS VACCINE





Patient Home Medication List


Home Medication List Reviewed:  Yes


Albuterol Sulfate (Proair Hfa) 1 Puff Puff, 2 PUFF IH QID


   Prescribed by: MANISH MCKEON on 22 0944


Atorvastatin Calcium (Atorvastatin Calcium) 20 Mg Tablet, 10 MG PO DAILY, 

(Reported)


   Entered as Reported by: HARMAN MEDINA on 22 1239


Dexamethasone (Decadron) 6 Mg Tablet, 6 MG PO DAILY


   Prescribed by: MANISH MCKEON on 22 0830


Fish Oil/Dha/Epa (Fish Oil 1,200 mg Fish Oil) 1 Each Capsule, 1 EACH PO DAILY, 

(Reported)


   Entered as Reported by: HARMAN MEDINA on 22 1239


Fluticasone/Salmeterol (Fluticasone-Salmeterol 113-14) 1 Each Aer.pow.ba, 0 EACH

IH RTBID


   Prescribed by: MANISH MCKEON on 22 0830


Guaifenesin (Mucinex) 600 Mg Tab.er.12h, 600 MG PO BID


   Prescribed by: MANISH MCKEON on 22 0830


Lutein (Lutein) 20 Mg Tablet, 20 MG PO DAILY, (Reported)


   Entered as Reported by: HARMAN MEDINA on 22 1239


Metoprolol Succinate (Metoprolol Succinate) 100 Mg Tab.er.24h, 100 MG PO DAILY, 

(Reported)


   Entered as Reported by: MANISH MCKEON on 22 1839


Montelukast Sodium (Montelukast Sodium) 10 Mg Tablet, 10 MG PO DAILY


   Prescribed by: MANISH MCKEON on 22 0830


Multivitamin (Multivitamin) 1 Each Tablet, 1 EACH PO DAILY, (Reported)


   Entered as Reported by: HARMAN MEDINA on 22 1239


Ubidecarenone (Coq-10) 100 Mg Capsule, 100 MG PO DAILY, (Reported)


   Entered as Reported by: HARMAN MEDINA on 22 1239


Zinc (Zinc) 50 Mg Tablet, 50 MG PO DAILY, (Reported)


   Entered as Reported by: HARMAN MEDINA on 22 1239





Review of Systems


Review of Systems


Constitutional:  see HPI


EENTM:  No ear discharge, No ear pain, No blurred vision, No vision loss, No 

epistaxis, No nose congestion


Respiratory:  short of breath


Cardiovascular:  No chest pain


Gastrointestinal:  see HPI


Genitourinary:  no symptoms reported


Musculoskeletal:  no symptoms reported


Skin:  No rash


Psychiatric/Neurological:  See HPI


Hematologic/Lymphatic:  Denies Blood Clots





Past Medical-Social-Family Hx


Patient Social History


Tobacco Use?:  No


Use of E-Cig and/or Vaping dev:  No


Substance use?:  No


Alcohol Use?:  No


Pt feels they are or have been:  No





Past Medical History


Surgery/Hospitalization HX:  


Atrial Fibrillation with prior ablation, Hypertrophic Cardiomyopathy,


Hypercholesterolemia, Hypertension


Respiratory:  Yes


Asthma


Cardiac:  Yes


Atrial Fibrillation, Cardiomyopathy, Coronary Artery Disease, High Cholesterol, 

Hypertension





Physical Exam


Vital Signs





Vital Signs - First Documented








 22





 19:13


 


Temp 36.3


 


Pulse 121


 


Resp 20


 


B/P (MAP) 164/94 (117)


 


Pulse Ox 92


 


O2 Delivery Room Air





Capillary Refill :


Height, Weight, BMI


Height: '"


Weight: lbs. oz. kg; 37.74 BMI


Method:


General Appearance:  Anxious, Mild Distress (dry heaves and vomiting, especially

with movement of head or ED cot)


Eyes:  Bilateral Eye PERRL, Bilateral Eye EOMI


HEENT:  Pharynx Normal, Moist Mucous Membranes


Neck:  Full Range of Motion, Normal Inspection, Non Tender, Supple


Respiratory:  Chest Non Tender, Lungs Clear, Normal Breath Sounds, No Accessory 

Muscle Use, No Respiratory Distress


Cardiovascular:  Normal Peripheral Pulses, Irregularly Irregular, Tachycardia


Gastrointestinal:  Normal Bowel Sounds, No Pulsatile Mass, Non Tender, Soft


Rectal:  Deferred


Extremity:  Normal Capillary Refill, Normal Inspection, Normal Range of Motion, 

No Pedal Edema


Neurologic/Psychiatric:  Alert, Oriented x3, Facial Droop (mild right sided 

facial weakness with loss of nasolabial fold), Sensory Deficit (states right 

side feels different with tingling compared to left side)


Skin:  Normal Color, Warm/Dry





Progress/Results/Core Measures


Suspected Sepsis


SIRS


Temperature: 


Pulse:  


Respiratory Rate: 


 


Laboratory Tests


22 19:15: White Blood Count 10.0


Blood Pressure  / 


Mean: 


 


Laboratory Tests


22 19:15: 


Creatinine 0.74, INR Comment 1.0, Platelet Count 148, Total Bilirubin 1.8H








Results/Orders


Lab Results





Laboratory Tests








Test


 22


19:15 22


19:23 22


19:36 Range/Units


 


 


White Blood Count


 10.0 


 


 


 4.3-11.0


10^3/uL


 


Red Blood Count


 5.32 


 


 


 4.30-5.52


10^6/uL


 


Hemoglobin 17.1    13.3-17.7  g/dL


 


Hematocrit 47    40-54  %


 


Mean Corpuscular Volume 88    80-99  fL


 


Mean Corpuscular Hemoglobin 32    25-34  pg


 


Mean Corpuscular Hemoglobin


Concent 37 H


 


 


 32-36  g/dL





 


Red Cell Distribution Width 12.3    10.0-14.5  %


 


Platelet Count


 148 


 


 


 130-400


10^3/uL


 


Mean Platelet Volume 10.3    9.0-12.2  fL


 


Immature Granulocyte % (Auto) 0     %


 


Neutrophils (%) (Auto) 71    42-75  %


 


Lymphocytes (%) (Auto) 22    12-44  %


 


Monocytes (%) (Auto) 6    0-12  %


 


Eosinophils (%) (Auto) 0    0-10  %


 


Basophils (%) (Auto) 0    0-10  %


 


Neutrophils # (Auto)


 7.1 


 


 


 1.8-7.8


10^3/uL


 


Lymphocytes # (Auto)


 2.2 


 


 


 1.0-4.0


10^3/uL


 


Monocytes # (Auto)


 0.6 


 


 


 0.0-1.0


10^3/uL


 


Eosinophils # (Auto)


 0.0 


 


 


 0.0-0.3


10^3/uL


 


Basophils # (Auto)


 0.0 


 


 


 0.0-0.1


10^3/uL


 


Immature Granulocyte # (Auto)


 0.0 


 


 


 0.0-0.1


10^3/uL


 


Prothrombin Time 13.7    12.2-14.7  SEC


 


INR Comment 1.0    0.8-1.4  


 


Activated Partial


Thromboplast Time 25 


 


 


 24-35  SEC





 


Sodium Level 142    135-145  MMOL/L


 


Potassium Level 4.5    3.6-5.0  MMOL/L


 


Chloride Level 106      MMOL/L


 


Carbon Dioxide Level 22    21-32  MMOL/L


 


Anion Gap 14    5-14  MMOL/L


 


Blood Urea Nitrogen 16    7-18  MG/DL


 


Creatinine


 0.74 


 


 


 0.60-1.30


MG/DL


 


Estimat Glomerular Filtration


Rate 102 


 


 


  





 


BUN/Creatinine Ratio 22     


 


Glucose Level 135 H     MG/DL


 


Calcium Level 9.7    8.5-10.1  MG/DL


 


Corrected Calcium     8.5-10.1  MG/DL


 


Total Bilirubin 1.8 H   0.1-1.0  MG/DL


 


Aspartate Amino Transf


(AST/SGOT) 25 


 


 


 5-34  U/L





 


Alanine Aminotransferase


(ALT/SGPT) 43 


 


 


 0-55  U/L





 


Alkaline Phosphatase 116      U/L


 


Troponin I < 0.30    <0.30  NG/ML


 


Total Protein 7.2    6.4-8.2  GM/DL


 


Albumin 4.8 H   3.2-4.5  GM/DL


 


Lipase 18    8-78  U/L


 


Influenza Type A (RT-PCR)  Not Detected   Not Detecte  


 


Influenza Type B (RT-PCR)  Not Detected   Not Detecte  


 


SARS-CoV-2 RNA (RT-PCR)  Not Detected   Not Detecte  


 


Glucometer   149 H   MG/DL








My Orders





Orders - ENYART,SG E MD


Ondansetron Injection (Zofran Injectio (22 19:30)


Ondansetron Injection (Zofran Injectio (22 19:18)


Cbc With Automated Diff (22 19:20)


Protime With Inr (22 19:20)


Partial Thromboplastin Time (22 19:20)


Comprehensive Metabolic Panel (22 19:20)


Troponin I Fs (22 19:20)


Ua Culture If Indicated (22 19:20)


Chest 1 View Ap/Pa Only (22 19:20)


Ekg Tracing (22 19:20)


Nothing By Mouth (22 Dinner)


Accucheck Stat ONCE (22 19:20)


Ed Iv/Invasive Line Start (22 19:20)


Vital Signs Stroke Patient Q15M (22 19:20)


Ct Head Wo-R/O Stroke (22 19:20)


O2 (22 19:20)


Monitor-Rhythm Ecg Trace Only (22 19:20)


Dysphagia Screening Tool Q10MX1 (22 19:20)


Lipase (22 19:20)


Covid 19 Inhouse Test (22 19:20)


Influenza A And B By Pcr (22 19:20)


Isolation Central Supply Req (22 19:20)


Promethazine Injection (Phenergan Injec (22 19:51)


Ct Angio Head/Neck (22 19:54)


Iohexol Injection (Omnipaque 350 Mg/Ml 1 (22 20:00)


Received Contrast (Hold Metformin- Contr (22 20:00)


Sodium Chloride Flush (Catheter Flush Sy (22 20:00)


Ns (Ivpb) (Sodium Chloride 0.9% Ivpb Bag (22 20:00)


Enoxaparin Injection (Lovenox Injection) (22 21:14)





Medications Given in ED





Current Medications








 Medications  Dose


 Ordered  Sig/Xavier


 Route  Start Time


 Stop Time Status Last Admin


Dose Admin


 


 Iohexol  100 ml  ONCE  ONCE


 IV  22 20:00


 22 20:01 DC 22 20:12


75 ML


 


 Ondansetron HCl  4 mg  ONCE  ONCE


 IVP  22 19:30


 22 19:31 DC 22 19:26


4 MG


 


 Sodium Chloride  10 ml  AS NEEDED  PRN


 IV  22 20:00


    22 20:12


10 ML


 


 Sodium Chloride  100 ml  ONCE  ONCE


 IV  22 20:00


 22 20:01 DC 22 20:12


100 ML








Vital Signs/I&O











 22





 19:13 21:33 21:58


 


Temp 36.3  


 


Pulse 121 97 102


 


Resp 20 16 20


 


B/P (MAP) 164/94 (117) 146/92 141/97


 


Pulse Ox 92 93 93


 


O2 Delivery Room Air Room Air 





Capillary Refill :


Progress Note #1:  


Progress Note


NIHSS performed on arrival and he had a 2 with 1 off for tingling and decreased 

sensation to right side and 1 off for slight loss of nasolabial fold on right 

side of face. Obtain ECG to look for ischemia and document his rhythm, telemetry

monitor to watch his rate and rhythm, chest xray to look for pneumonia, 

infiltrate, effusion, pneumothorax. CT head without contrast to evaluate for 

stroke, mass, bleeding, edema, midline shift with his complaint of right sided t

ingling and weakness earlier around 1730 as well as headache with dizziness and 

nausea/vomiting since 4 am. Labs and urine to check for infection, anemia, 

electrolyte imbalance, heart attack, heart failure, renal failure, hepatic 

failure, UTI, coagulopathy. Zofran 4 mg IV to help with  nausea/vomiting. 


Patient states his cardiologist is Dr. Chu from [facility]


Progress Note #2:  


   Time:  19:53


Progress Note


CBC and chemistry are stable without acute significant abnormality.  His 

troponin is less than 0.3.  He does have elevation of his total bilirubin to 

1.8.  His glucose was slightly elevated to 135.  He had normal coags.  His 

influenza and COVID swab were both negative.  CT scan of his head did not show a

ny acute process for his symptoms.  Chest x-ray showed cardiomegaly with some 

early edema.  There is no infiltrate or effusion. Will add on CT angiogram of 

head and neck to look for possible large vessel occlusion, aneurysm, signs of 

stroke. Since still having dizziness, nausea, vomiting despite 2 doses of 

Zofran, 4 mg IV from EMS and 4 mg IV here in ED, will try Promethazine 25 mg IV 

x 1. 





When reviewing results and findings with patient and family, his wife reports 

that he now has a hoarseness to his voice that was not there earlier this 

evening. He is having some dysarthria now after getting Promethazine. This could

be due to the sedating effect of the anti-emetic. His hoarseness may be due to 

his dry heaves with EMS and then active vomiting here in the ED. Once the CT 

angiogram is back I will contact  Stroke Neurologist on call for consultation.

At this point, he does not seem to be a candidate for tPA with onset of 

dizziness and n/v with headache at 4 am upon waking up. His other symptoms of 

right sided tingling and weakness started around 1730 but the weakness resolved 

and now just slight tingling to right side.


Progress Note #3:  


Progress Note


2030 Dr. Mary Jo Hinojosa from Radiology called with findings of V4 segment of 

Right vertebral artery having occlusion without ischemic changes to brain. 





203 I spoke with RANCHO Chandra, at [facility] and gave her basic 

information on the stroke patient. She will get transfer doctor and stroke 

doctor on line and call me back. 


I updated patient and family of findings and that instead of calling  Stroke 

doctor I called [facility] directly as the patient gets routine care through 

the [facility] system. Discussed that he might need to be flown by helicopter 

if they are wanting to do any radiology or treatment interventions. Counseled 

that unless instructed to give tPA by Neurologist I would defer that as he had 

increased risk of bleeding and causing more problems with his symptoms starting 

at 4 am and additional symptoms at 1730. 





 I spoke with transfer doctor, Dr. Zarco, and neurologist, Dr. Paul, 

from [facility]. I gave them a summary of the patient and his 

symptoms and timing of symptoms. Dr. Paul advised that he would not be a tPA

candidate at this time and Interventional Radiology will not be able do anything

with the Vertebral artery segment. He advised routine stroke work up and MRI but

did not recommend anti-coagulation for his recurrent paroxysmal atrial 

fibrillation or his stroke symptoms until an MRI could confirm a stroke. They 

will look to see what the bed status is in the [facility] system and call 

back. 


Initially I had advised pt and family that he might need to be flown if they are

doing any intervention emergently so that he could get there potentially quicker

than ground transport. Although, with Neurology stating that the patient would 

not be a candidate for emergent interventions or tPA in interest of time 

sensitive diagnosis will contact air helicopter for transport. 





 Dr. Zarco called back and after speaking with hospitalist he requested pt

get at least a dose of Lovenox 1 mg/kg prior to transport. 





 Helicopter ambulance crew leaving with patient to go to [facility]. Anticipate

approximately 30 min transport time to [facility] on [address].





ECG


Initial ECG Impression Date:  Sep 23, 2022


Initial ECG Impression Time:  19:14


Initial ECG Rate:  120


Initial ECG Rhythm:  A Fib/Flutter


Initial ECG Comparisson:  Changed (Atrial fibrillation with RVR present tonight 

vs 2022 having sinus rhythm with LBBB and PVCs)


Comment


Atrial fibrillation with rapid ventricular response and heart rate 120 bpm.  

Intraventricular conduction delay.  QT interval 350 ms with a QTc interval 421 

ms.  There is no acute ST elevation.  Compared to 2022 he now is in 

atrial fibrillation where he was sinus rhythm with LBBB and PVCs in 2022





Diagnostic Imaging





   Diagonstic Imaging:  Xray


   Plain Films/CT/US/NM/MRI:  chest


Comments


                 [facility].


                                [place], [place]





NAME:   JOELLE LIAO


MED REC#:   [id number]


ACCOUNT#:   [id number]


PT STATUS:   REG ER


:   1960


PHYSICIAN:   SG CHAVEZ MD


ADMIT DATE:   22/ER FS


                                  ***Signed***


Date of Exam:22





CHEST 1 VIEW AP/PA ONLY








EXAMINATION: Chest 1 view.





HISTORY: Shortness of breath. Atrial fibrillation. Right-sided


weakness.





COMPARISON: 2022.





FINDINGS: The lung volumes are normal. No focal consolidation is


seen. Scattered prominent interstitial markings are seen in the


lungs. No large pleural effusion or pneumothorax is seen. The


cardiomediastinal silhouette is prominent with post-CABG changes.


No acute osseous abnormality is seen.





IMPRESSION: Cardiomegaly with possible early edema. No focal


consolidation or pleural effusion. 





Dictated by: 





  Dictated on workstation # [id number]








Dict:   22


Trans:   22


[facility] [id number]





Interpreted by:     MARY JO HINOJOSA DO


Electronically signed by: MARY JO HINOJOSA DO 22


   Reviewed:  Reviewed by Me








   Diagonstic Imaging:  CT


   Plain Films/CT/US/NM/MRI:  head


Comments


NAME:   JOELLE LIAO


MED REC#:   [id number]


ACCOUNT#:   [id number]


PT STATUS:   REG ER


:   1960


PHYSICIAN:   SG CHAVEZ MD


ADMIT DATE:   22/ER FS


                                  ***Signed***


Date of Exam:22





CT HEAD WO-R/O STROKE








EXAMINATION: CT head without contrast.





TECHNIQUE: Multiple contiguous axial images were obtained through


the brain without the use of intravenous contrast. All CT scans


use one or more of the following dose optimizing techniques:


automated exposure control, MA and/or KvP adjustment based on


patient size and exam type or iterative reconstruction. 





HISTORY: Dizziness. Right-sided headache.





COMPARISON: None available.





FINDINGS: No large acute territorial ischemia, mass or


hemorrhage. No midline shift or mass effect. The ventricles,


cortical sulci and basilar cisterns are patent and unremarkable. 





The orbits are normal. Paranasal sinuses are normal. Mastoid air


cells are clear. No soft tissue abnormality is seen. No osseus


lesions or fractures are seen.





IMPRESSION: No large acute territorial ischemia, mass or


hemorrhage. If symptoms persist, consider MRI of the brain to


further evaluate.





Dictated by: 





  Dictated on workstation # [id number]








Dict:   22


Trans:   22


[facility] [id number]





Interpreted by:     MARY JO HINOJOSA DO


Electronically signed by: MARY JO HINOJOSA DO 22


   Reviewed:  Reviewed by Me








   Diagonstic Imaging:  CT (angiography)


   Plain Films/CT/US/NM/MRI:  head (and neck)


Comments


                 [facility] [place]





NAME:   JOELLE LIAO


MED REC#:   [id number]


ACCOUNT#:   [id number]


PT STATUS:   REG ER


:   1960


PHYSICIAN:   SG CHAVEZ MD


ADMIT DATE:   22/ER FS


                                  ***Signed***


Date of Exam:22





CT ANGIO HEAD/NECK








PROCEDURE: CT angiography of the head and CT angiography of the


neck with and without contrast.





TECHNIQUE: Contiguous noncontrast images were obtained from the


skull base through the vertex. After intravenous contrast


administration, helical CT angiography of the neck was performed.


Source data was reformatted into 3D MIP projections. Delayed post


contrast acquisition was also obtained. Auto Exposure Controls


were utilized during the CT exam to meet ALARA standards for


radiation dose reduction. 





INDICATION: Dizziness. Right side weakness. Headache.





COMPARISON: CT head performed earlier the same date.





FINDINGS: 





CTA NECK: The visualized portions of the aortic arch demonstrate


no evidence of aneurysm or dissection. There is conventional


branching pattern of the great vessels of the aorta. The


brachiocephalic artery is normal in course and caliber. The right


and left common carotid origins are unremarkable. The origin of


the left subclavian artery is patent.





The common carotid arteries and internal carotid arteries


demonstrate a normal course. No stenosis or dissection in the


carotid systems. The external carotid arteries are patent and


unremarkable.





The vertebral arteries are codominant. The origin of the right


vertebral artery is seen and is unremarkable. The origin of the


left vertebral artery is directly off the aorta and is


unremarkable. There is no focal stenosis seen within the neck.


There is no dissection. 





The osseous structures of the cervical spine are unremarkable. 





Included views through the lung apices demonstrate no focal


consolidation. 





CTA BRAIN: No stenosis or aneurysm in the intracranial portion of


the bilateral ICA. No stenosis is seen in the bilateral anterior,


middle and posterior cerebral arteries. No evidence of aneurysm


the [place]. 





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


vertebral artery. The right PICA is not visualized. The left PICA


and left V4 segment are patent. The basilar artery is normal in


course and caliber. The terminal branch vessels including the


superior cerebellar arteries unremarkable. 





IMPRESSION:


1. 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


artery. There is also nonvisualization of the right PICA. No


evidence of ischemia is seen within the parenchyma of the right


cerebellum at this time and intervention may be warranted.


Recommend neuro-interventional consultation.


2. No stenosis or dissection in the bilateral carotid arteries


and left vertebral artery.


3. No stenosis or aneurysm in the [place]. No large


vessel occlusion.





Findings were called to the emergency department at 8:30 PM on


2022 by Dr. Mary Jo Hinojosa. 





Dictated by: 





  Dictated on workstation # [id number]








Dict:   22


Trans:   22


[facility] [id number]





Interpreted by:     MARY JO HINOJOSA DO


Electronically signed by: MARY JO HINOJOSA DO 22


   Reviewed:  Reviewed by Me, Discussed w/Radiologist





Critical Care Note


Critical Care


Total Time (minutes)


75 minutes


Progress


75 minutes of critical care time was spent in care of the patient.  Time was 

spent obtaining history from the patient and family as well as EMS, review of 

the medical chart, ordering tests and reviewing results, discussion with 

consultants and family, documentation in the chart.  Patient was at risk of 

neurologic as well as cardiac compromise and required my direct continued care.





Departure


Impression





   Primary Impression:  


   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


   Additional Impressions:  


   Dizziness


   Nausea and vomiting in adult


   Tingling of right arm and right side of face


   Right-sided headache


   Atrial fibrillation with rapid ventricular response


Disposition:  30 STILL A PATIENT


Condition:  Critical





Transfer


Transfer Reason:  Exceeds level of care (Requires Neurology and Cardiology 

treatment)


Time Spoke to Accepting Phy:  20:41


Transfer Progress Notes


 discussed with Dr. Zarco and the neurologist Dr. Romano.  Neurology 

advised that the patient would not be an interventional radiology candidate or a

tPA candidate.  They recommended further stroke work-up with an MRI and 

evaluation.  He did not want to start anticoagulation for the atrial 

fibrillation and symptoms until he had the MRI.





 Dr. Zarco with the transfer center called back stating that they had a 

bed [facility].  The hospitalist did request to give the 

patient at least a dose of Lovenox weight-based here.  A 110 mg dose of Lovenox 

was administered and awaiting helicopter transfer for patient to go to [facility] on [address].


Transfer Facility:  


[facility] [address]


Method of Transfer:  Air (Due to time sensitive nature of stroke, transport by 

air for fastest method)





Departure-Patient Inst.


Referrals:  


NO,LOCAL PHYSICIAN (PCP/Family)


Primary Care Physician





NIH Stroke Scale


NIH Stroke Scale


NIH :  


   Select:  Initial ()


   Level of Consciousness:  0=Alert


   Level of Consciousness-Questio:  0=Answers both month/age


   LOC Commands:  0=Performs both tasks


   Gaze:  0=Normal


   Visual Fields:  0=No visual loss


   Facial Movement (Facial Paresi:  1=Minor paralysis


   Motor Function-Arms Right:  0=No drift


   Motor Function-Arms Left:  0=No drift


   Motor Function-Legs Right:  0=No drift


   Motor Function-Legs Left:  0=No drift


   Limb Ataxia:  0=Absent


   Sensory:  1=Mild to Moderate loss


   Best Language:  0=No aphasia


   Dysarthria:  0=Normal


   Extinction & Inattention:  0=No abnormality


   NIH Stroke Scale Score:  2











SG CHAVEZ MD               Sep 23, 2022 19:36